# Patient Record
Sex: MALE | Race: WHITE | NOT HISPANIC OR LATINO | Employment: UNEMPLOYED | URBAN - NONMETROPOLITAN AREA
[De-identification: names, ages, dates, MRNs, and addresses within clinical notes are randomized per-mention and may not be internally consistent; named-entity substitution may affect disease eponyms.]

---

## 2024-03-26 ENCOUNTER — OFFICE VISIT (OUTPATIENT)
Dept: FAMILY MEDICINE CLINIC | Facility: CLINIC | Age: 64
End: 2024-03-26
Payer: MEDICAID

## 2024-03-26 VITALS
OXYGEN SATURATION: 94 % | SYSTOLIC BLOOD PRESSURE: 120 MMHG | HEART RATE: 71 BPM | BODY MASS INDEX: 30.23 KG/M2 | DIASTOLIC BLOOD PRESSURE: 60 MMHG | WEIGHT: 192.6 LBS | HEIGHT: 67 IN

## 2024-03-26 DIAGNOSIS — I10 HYPERTENSION, UNSPECIFIED TYPE: ICD-10-CM

## 2024-03-26 DIAGNOSIS — N40.1 BENIGN PROSTATIC HYPERPLASIA (BPH) WITH STRAINING ON URINATION: ICD-10-CM

## 2024-03-26 DIAGNOSIS — Z76.0 ENCOUNTER FOR MEDICATION REFILL: ICD-10-CM

## 2024-03-26 DIAGNOSIS — M19.019 ACROMIOCLAVICULAR JOINT ARTHRITIS, UNSPECIFIED LATERALITY: ICD-10-CM

## 2024-03-26 DIAGNOSIS — R39.16 BENIGN PROSTATIC HYPERPLASIA (BPH) WITH STRAINING ON URINATION: ICD-10-CM

## 2024-03-26 DIAGNOSIS — E11.9 TYPE 2 DIABETES MELLITUS WITHOUT COMPLICATION, WITHOUT LONG-TERM CURRENT USE OF INSULIN: Chronic | ICD-10-CM

## 2024-03-26 DIAGNOSIS — Z00.00 ANNUAL PHYSICAL EXAM: ICD-10-CM

## 2024-03-26 DIAGNOSIS — E03.9 ACQUIRED HYPOTHYROIDISM: ICD-10-CM

## 2024-03-26 DIAGNOSIS — Z00.00 ENCOUNTER FOR MEDICAL EXAMINATION TO ESTABLISH CARE: Primary | ICD-10-CM

## 2024-03-26 DIAGNOSIS — E78.5 HYPERLIPIDEMIA, UNSPECIFIED HYPERLIPIDEMIA TYPE: ICD-10-CM

## 2024-03-26 DIAGNOSIS — Z12.5 SCREENING FOR PROSTATE CANCER: ICD-10-CM

## 2024-03-26 PROBLEM — L03.115 CELLULITIS OF RIGHT LEG: Status: ACTIVE | Noted: 2023-04-19

## 2024-03-26 PROBLEM — J45.909 ASTHMA: Status: ACTIVE | Noted: 2020-12-21

## 2024-03-26 PROBLEM — R41.3 AMNESIA: Status: ACTIVE | Noted: 2021-08-18

## 2024-03-26 PROBLEM — G62.9 NEUROPATHY: Status: ACTIVE | Noted: 2020-01-24

## 2024-03-26 PROBLEM — E55.9 VITAMIN D DEFICIENCY: Status: ACTIVE | Noted: 2020-12-23

## 2024-03-26 PROBLEM — F34.1 DYSTHYMIA: Status: ACTIVE | Noted: 2022-12-02

## 2024-03-26 PROBLEM — R53.83 FATIGUE: Status: ACTIVE | Noted: 2021-08-18

## 2024-03-26 PROBLEM — C34.11 MALIGNANT NEOPLASM OF UPPER LOBE OF RIGHT LUNG: Chronic | Status: RESOLVED | Noted: 2023-02-14 | Resolved: 2024-03-26

## 2024-03-26 PROBLEM — L30.9 DERMATITIS: Status: ACTIVE | Noted: 2023-05-14

## 2024-03-26 PROBLEM — I51.89 DIASTOLIC DYSFUNCTION: Status: ACTIVE | Noted: 2023-08-14

## 2024-03-26 PROBLEM — Z72.0 TOBACCO USER: Status: ACTIVE | Noted: 2021-03-08

## 2024-03-26 PROBLEM — Z72.0 CURRENT NICOTINE USE: Status: ACTIVE | Noted: 2022-01-13

## 2024-03-26 PROBLEM — J43.1 PANLOBULAR EMPHYSEMA: Chronic | Status: ACTIVE | Noted: 2022-07-10

## 2024-03-26 PROBLEM — Z09 CHEMOTHERAPY FOLLOW-UP EXAMINATION: Status: ACTIVE | Noted: 2022-09-09

## 2024-03-26 PROBLEM — M75.42 INTERNAL IMPINGEMENT OF LEFT SHOULDER: Status: ACTIVE | Noted: 2021-05-10

## 2024-03-26 PROBLEM — C34.91 NON-SMALL CELL CANCER OF RIGHT LUNG: Chronic | Status: ACTIVE | Noted: 2022-06-20

## 2024-03-26 LAB
BILIRUB BLD-MCNC: NEGATIVE MG/DL
CLARITY, POC: CLEAR
COLOR UR: YELLOW
GLUCOSE UR STRIP-MCNC: ABNORMAL MG/DL
KETONES UR QL: NEGATIVE
LEUKOCYTE EST, POC: NEGATIVE
NITRITE UR-MCNC: NEGATIVE MG/ML
PH UR: 6 [PH] (ref 5–8)
PROT UR STRIP-MCNC: NEGATIVE MG/DL
RBC # UR STRIP: NEGATIVE /UL
SP GR UR: 1.01 (ref 1–1.03)
UROBILINOGEN UR QL: NORMAL

## 2024-03-26 RX ORDER — TAMSULOSIN HYDROCHLORIDE 0.4 MG/1
1 CAPSULE ORAL DAILY
COMMUNITY
End: 2024-03-26 | Stop reason: SDUPTHER

## 2024-03-26 RX ORDER — BUPROPION HYDROCHLORIDE 150 MG/1
150 TABLET, EXTENDED RELEASE ORAL
Qty: 90 TABLET | Refills: 1 | Status: SHIPPED | OUTPATIENT
Start: 2024-03-26 | End: 2024-09-22

## 2024-03-26 RX ORDER — TAMSULOSIN HYDROCHLORIDE 0.4 MG/1
1 CAPSULE ORAL
Qty: 90 CAPSULE | Refills: 1 | Status: SHIPPED | OUTPATIENT
Start: 2024-03-26 | End: 2024-09-22

## 2024-03-26 RX ORDER — LEVOTHYROXINE SODIUM 0.12 MG/1
125 TABLET ORAL DAILY
COMMUNITY
End: 2024-03-28 | Stop reason: SDUPTHER

## 2024-03-26 RX ORDER — BUPROPION HYDROCHLORIDE 150 MG/1
150 TABLET, EXTENDED RELEASE ORAL 2 TIMES DAILY
COMMUNITY
End: 2024-03-26 | Stop reason: SDUPTHER

## 2024-03-26 RX ORDER — CHOLECALCIFEROL (VITAMIN D3) 125 MCG
50000 TABLET ORAL WEEKLY
Qty: 12 TABLET | Refills: 1 | Status: SHIPPED | OUTPATIENT
Start: 2024-03-26 | End: 2024-04-17

## 2024-03-26 RX ORDER — DULOXETIN HYDROCHLORIDE 60 MG/1
60 CAPSULE, DELAYED RELEASE ORAL
Qty: 90 CAPSULE | Refills: 1 | Status: SHIPPED | OUTPATIENT
Start: 2024-03-26 | End: 2024-09-22

## 2024-03-26 RX ORDER — ATORVASTATIN CALCIUM 20 MG/1
20 TABLET, FILM COATED ORAL NIGHTLY
Qty: 90 TABLET | Refills: 1 | Status: SHIPPED | OUTPATIENT
Start: 2024-03-26 | End: 2024-09-22

## 2024-03-26 RX ORDER — DULAGLUTIDE 1.5 MG/.5ML
1.5 INJECTION, SOLUTION SUBCUTANEOUS WEEKLY
Qty: 2 ML | Refills: 3 | Status: SHIPPED | OUTPATIENT
Start: 2024-03-26 | End: 2024-03-28 | Stop reason: SDUPTHER

## 2024-03-26 RX ORDER — LOSARTAN POTASSIUM 25 MG/1
25 TABLET ORAL DAILY
COMMUNITY
End: 2024-03-26 | Stop reason: SDUPTHER

## 2024-03-26 RX ORDER — LOSARTAN POTASSIUM 25 MG/1
25 TABLET ORAL
Qty: 90 TABLET | Refills: 1 | Status: SHIPPED | OUTPATIENT
Start: 2024-03-26 | End: 2024-09-22

## 2024-03-26 RX ORDER — ATORVASTATIN CALCIUM 20 MG/1
20 TABLET, FILM COATED ORAL DAILY
COMMUNITY
End: 2024-03-26 | Stop reason: SDUPTHER

## 2024-03-26 RX ORDER — DULOXETIN HYDROCHLORIDE 60 MG/1
60 CAPSULE, DELAYED RELEASE ORAL DAILY
COMMUNITY
End: 2024-03-26 | Stop reason: SDUPTHER

## 2024-03-26 NOTE — PROGRESS NOTES
Chief Complaint  Establish Care (HX of lung cancer )    Subjective        Chepe Meneses presents to [facility]  C/o 1. Establish care as his PCP 2. Annual Physical 3. Fasting labs 4. Chronic illness   Diabetes  He presents for his follow-up diabetic visit. He has type 2 diabetes mellitus. No MedicAlert identification noted. His disease course has been worsening. Pertinent negatives for hypoglycemia include no headaches. Pertinent negatives for diabetes include no blurred vision and no chest pain. Symptoms are worsening. Risk factors for coronary artery disease include dyslipidemia, hypertension, obesity and post-menopausal. Current diabetic treatment includes diet and oral agent (monotherapy). He is compliant with treatment all of the time. His weight is increasing steadily. He is following a generally healthy diet. When asked about meal planning, he reported none. He has not had a previous visit with a dietitian. He participates in exercise intermittently. An ACE inhibitor/angiotensin II receptor blocker is being taken. He does not see a podiatrist. Eye exam is current.   Hypertension  This is a chronic problem. The current episode started more than 1 year ago. The problem has been stable since onset. The problem is controlled. Pertinent negatives include no blurred vision, chest pain, headaches, palpitations or shortness of breath. Agents associated with hypertension include thyroid hormones. Risk factors for coronary artery disease include dyslipidemia, diabetes mellitus, male gender and obesity. Past treatments include lifestyle changes and angiotensin blockers. Current antihypertension treatment includes lifestyle changes and angiotensin blockers. The current treatment provides moderate improvement. There are no compliance problems.  There is no history of angina, kidney disease or CAD/MI. There is no history of chronic renal disease.   Hyperlipidemia  This is a chronic problem.  "The current episode started more than 1 year ago. The problem is uncontrolled. Exacerbating diseases include obesity. He has no history of chronic renal disease. Pertinent negatives include no chest pain, focal sensory loss, leg pain or shortness of breath. Current antihyperlipidemic treatment includes statins. There are no compliance problems.  Risk factors for coronary artery disease include obesity, male sex, hypertension and diabetes mellitus.   Benign Prostatic Hypertrophy  This is a chronic problem. The current episode started more than 1 year ago. The problem has been waxing and waning since onset. Obstructive symptoms include a slower stream. Past treatments include tamsulosin. The treatment provided moderate relief.       Objective   Vital Signs:  /60   Pulse 71   Ht 170.2 cm (67\")   Wt 87.4 kg (192 lb 9.6 oz)   SpO2 94%   BMI 30.17 kg/m²   Estimated body mass index is 30.17 kg/m² as calculated from the following:    Height as of this encounter: 170.2 cm (67\").    Weight as of this encounter: 87.4 kg (192 lb 9.6 oz).            Physical Exam  Vitals and nursing note reviewed. Exam conducted with a chaperone present.   Constitutional:       General: He is awake.      Appearance: Normal appearance. He is well-developed and well-groomed. He is obese.   HENT:      Head: Normocephalic and atraumatic.      Jaw: There is normal jaw occlusion.      Right Ear: Hearing, tympanic membrane, ear canal and external ear normal.      Left Ear: Hearing, tympanic membrane, ear canal and external ear normal.      Nose: Nose normal.      Mouth/Throat:      Lips: Pink.      Mouth: Mucous membranes are moist.      Pharynx: Oropharynx is clear.   Eyes:      General: Lids are normal. Vision grossly intact. Gaze aligned appropriately.      Extraocular Movements: Extraocular movements intact.      Conjunctiva/sclera: Conjunctivae normal.      Pupils: Pupils are equal, round, and reactive to light.   Neck:      Trachea: " Trachea and phonation normal.   Cardiovascular:      Rate and Rhythm: Normal rate and regular rhythm.      Pulses: Normal pulses.      Heart sounds: Normal heart sounds.   Pulmonary:      Effort: Pulmonary effort is normal.      Breath sounds: Normal breath sounds and air entry.   Chest:      Comments: POSITIVE SURGICAL SCAR S/P LUNG CANCER SURGERY   Abdominal:      General: Abdomen is protuberant. Bowel sounds are normal.      Palpations: Abdomen is soft.   Genitourinary:     Rectum: Normal.   Musculoskeletal:         General: Normal range of motion.      Right shoulder: Normal.      Left shoulder: Normal.      Right upper arm: Normal.      Left upper arm: Normal.      Right elbow: Normal.      Left elbow: Normal.      Right forearm: Normal.      Left forearm: Normal.      Right wrist: Normal.      Left wrist: Normal.      Right hand: Normal.      Left hand: Normal.      Cervical back: Normal, full passive range of motion without pain, normal range of motion and neck supple.      Thoracic back: Normal.      Lumbar back: Normal.      Right hip: Normal.      Left hip: Normal.      Right upper leg: Normal.      Left upper leg: Normal.      Right knee: Normal.      Left knee: Normal.      Right lower leg: Normal. No edema.      Left lower leg: Normal. No edema.      Right ankle: Normal.      Right Achilles Tendon: Normal.      Left ankle: Normal.      Left Achilles Tendon: Normal.      Right foot: Normal.      Left foot: Normal.   Lymphadenopathy:      Cervical: No cervical adenopathy.   Skin:     General: Skin is warm.      Capillary Refill: Capillary refill takes less than 2 seconds.   Neurological:      General: No focal deficit present.      Mental Status: He is alert and oriented to person, place, and time.      Cranial Nerves: Cranial nerves 2-12 are intact.      Sensory: Sensation is intact.      Motor: Motor function is intact.      Coordination: Coordination is intact.      Gait: Gait is intact.      Deep  Tendon Reflexes: Reflexes are normal and symmetric.   Psychiatric:         Attention and Perception: Attention and perception normal.         Mood and Affect: Mood and affect normal.         Speech: Speech normal.         Behavior: Behavior normal. Behavior is cooperative.         Thought Content: Thought content normal.         Cognition and Memory: Cognition and memory normal.         Judgment: Judgment normal.        Result Review :  The following data was reviewed by: Gamaliel Luke MD on 03/26/2024:  Common labs          11/7/2023    11:53 11/7/2023    13:42 1/5/2024    13:49 2/27/2024    15:07 2/27/2024    16:06   Common Labs   EGFR  Am 97       97        WBC  5.82     6.17      4.84       Hemoglobin  14.2     14.5      13.0       Hematocrit  45.5     46.0      40.6       Platelets  243     212      216          Details          This result is from an external source.             Data reviewed : Radiologic studies 2/27/24 CT Chest            Assessment and Plan   Diagnoses and all orders for this visit:    1. Encounter for medical examination to establish care (Primary)    2. Annual physical exam  -     CBC & Differential; Future  -     Comprehensive Metabolic Panel; Future  -     Lipid Panel; Future  -     Hemoglobin A1c; Future  -     TSH; Future  -     Hepatitis C Antibody; Future  -     PSA Screen; Future  -     Vitamin D,25-Hydroxy; Future  -     Vitamin B12; Future  -     MicroAlbumin, Urine, Random - Urine, Clean Catch  -     POC Urinalysis Dipstick    3. Type 2 diabetes mellitus without complication, without long-term current use of insulin  -     CBC & Differential; Future  -     Comprehensive Metabolic Panel; Future  -     Lipid Panel; Future  -     Hemoglobin A1c; Future  -     TSH; Future  -     Hepatitis C Antibody; Future  -     PSA Screen; Future  -     Vitamin D,25-Hydroxy; Future  -     Vitamin B12; Future  -     MicroAlbumin, Urine, Random - Urine, Clean Catch  -     POC  Urinalysis Dipstick    4. Acquired hypothyroidism  -     CBC & Differential; Future  -     Comprehensive Metabolic Panel; Future  -     Lipid Panel; Future  -     Hemoglobin A1c; Future  -     TSH; Future  -     Hepatitis C Antibody; Future  -     PSA Screen; Future  -     Vitamin D,25-Hydroxy; Future  -     Vitamin B12; Future  -     MicroAlbumin, Urine, Random - Urine, Clean Catch  -     POC Urinalysis Dipstick    5. Hypertension, unspecified type  -     CBC & Differential; Future  -     Comprehensive Metabolic Panel; Future  -     Lipid Panel; Future  -     Hemoglobin A1c; Future  -     TSH; Future  -     Hepatitis C Antibody; Future  -     PSA Screen; Future  -     Vitamin D,25-Hydroxy; Future  -     Vitamin B12; Future  -     MicroAlbumin, Urine, Random - Urine, Clean Catch  -     POC Urinalysis Dipstick    6. Hyperlipidemia, unspecified hyperlipidemia type  -     CBC & Differential; Future  -     Comprehensive Metabolic Panel; Future  -     Lipid Panel; Future  -     Hemoglobin A1c; Future  -     TSH; Future  -     Hepatitis C Antibody; Future  -     PSA Screen; Future  -     Vitamin D,25-Hydroxy; Future  -     Vitamin B12; Future  -     MicroAlbumin, Urine, Random - Urine, Clean Catch  -     POC Urinalysis Dipstick    7. Benign prostatic hyperplasia (BPH) with straining on urination  -     PSA Screen; Future    8. Acromioclavicular joint arthritis, unspecified laterality    9. Encounter for medication refill    10. Screening for prostate cancer  -     PSA Screen; Future    Other orders  -     buPROPion SR (WELLBUTRIN SR) 150 MG 12 hr tablet; Take 1 tablet by mouth Daily With Breakfast for 180 days.  Dispense: 90 tablet; Refill: 1  -     Dulaglutide (Trulicity) 1.5 MG/0.5ML solution pen-injector; Inject 1.5 mg under the skin into the appropriate area as directed 1 (One) Time Per Week for 16 doses.  Dispense: 2 mL; Refill: 3  -     tamsulosin (FLOMAX) 0.4 MG capsule 24 hr capsule; Take 1 capsule by mouth Daily  With Dinner for 180 days.  Dispense: 90 capsule; Refill: 1  -     atorvastatin (LIPITOR) 20 MG tablet; Take 1 tablet by mouth Every Night for 180 days.  Dispense: 90 tablet; Refill: 1  -     losartan (COZAAR) 25 MG tablet; Take 1 tablet by mouth Daily With Breakfast for 180 days.  Dispense: 90 tablet; Refill: 1  -     diclofenac (VOLTAREN) 50 MG EC tablet; Take 1 tablet by mouth Daily With Lunch for 180 days.  Dispense: 90 tablet; Refill: 1  -     Ergocalciferol (Vitamin D2) 50 MCG (2000 UT) tablet; Take 50,000 Units by mouth 1 (One) Time Per Week for 4 doses.  Dispense: 12 tablet; Refill: 1  -     metFORMIN (GLUCOPHAGE) 1000 MG tablet; TAKE 1 TABS AT BREAKFAST & 1 TABS AT DINNER  Dispense: 180 tablet; Refill: 1  -     DULoxetine (CYMBALTA) 60 MG capsule; Take 1 capsule by mouth Daily With Dinner for 180 days.  Dispense: 90 capsule; Refill: 1  -     empagliflozin (Jardiance) 25 MG tablet tablet; Take 1 tablet by mouth Daily With Lunch for 180 days.  Dispense: 90 tablet; Refill: 1           I spent 30 minutes caring for Chepe on this date of service. This time includes time spent by me in the following activities:reviewing tests, obtaining and/or reviewing a separately obtained history, performing a medically appropriate examination and/or evaluation , counseling and educating the patient/family/caregiver, ordering medications, tests, or procedures, and documenting information in the medical record     The preventive exam has been reviewed in detail.  The patient has been fully counseled on preventative guidelines for vaccines, cancer screenings, and other health maintenance needs.   The patient has been counseled on guidelines for maintaining a lifestyle to promote good health and to minimize chronic diseases.  The patient has been assisted with scheduling these healthcare procedures for the coming year and given a written document of health maintenance and anticipatory guidance for age with the AVS.       Follow  Up   Return in about 1 year (around 3/27/2025) for Annual physical, RTC FASTING LABS & FSDTING LABS THIS WEEK / 1 MONTH  FF/UP .  Patient was given instructions and counseling regarding his condition or for health maintenance advice. Please see specific information pulled into the AVS if appropriate.           This document has been electronically signed by Gamaliel Luke MD  March 26, 2024 17:00 EDT

## 2024-03-28 DIAGNOSIS — E11.00 UNCONTROLLED TYPE 2 DM WITH HYPEROSMOLAR NONKETOTIC HYPERGLYCEMIA: Primary | ICD-10-CM

## 2024-03-28 DIAGNOSIS — E03.9 HYPOTHYROIDISM, UNSPECIFIED TYPE: ICD-10-CM

## 2024-03-28 LAB — MICROALBUMIN UR-MCNC: 3.2 UG/ML

## 2024-03-28 RX ORDER — LEVOTHYROXINE SODIUM 0.2 MG/1
200 TABLET ORAL
Qty: 90 TABLET | Refills: 1 | Status: SHIPPED | OUTPATIENT
Start: 2024-03-28 | End: 2024-04-01 | Stop reason: SDUPTHER

## 2024-03-28 RX ORDER — DULAGLUTIDE 4.5 MG/.5ML
4.5 INJECTION, SOLUTION SUBCUTANEOUS WEEKLY
Qty: 0.5 ML | Refills: 0 | Status: SHIPPED | OUTPATIENT
Start: 2024-03-28 | End: 2024-04-19

## 2024-03-28 NOTE — PROGRESS NOTES
Patient regarding lab results and advised concerns of high sugar and high thyroid.  I have adjusted medications for this to ailments.  And will refer to Endo.  All the other lab values were noncritical.  Patient verbalizes understanding and reassured further to please proceed to the emergency room should any symptoms occur..

## 2024-04-01 PROBLEM — L85.1 ACQUIRED PLANTAR KERATODERMA: Status: ACTIVE | Noted: 2022-10-10

## 2024-04-01 PROBLEM — I87.309 CHRONIC PERIPHERAL VENOUS HYPERTENSION: Status: ACTIVE | Noted: 2023-02-10

## 2024-04-01 PROBLEM — E11.42 DIABETIC PERIPHERAL NEUROPATHY: Status: ACTIVE | Noted: 2022-08-03

## 2024-04-01 PROBLEM — D23.70 BENIGN NEOPLASM OF SKIN OF FOOT: Status: ACTIVE | Noted: 2022-08-03

## 2024-04-01 PROBLEM — B35.1 ONYCHOMYCOSIS: Status: ACTIVE | Noted: 2022-08-03

## 2024-04-01 PROBLEM — M20.40 HAMMER TOE: Status: ACTIVE | Noted: 2023-02-10

## 2024-04-01 PROBLEM — G60.9 IDIOPATHIC PERIPHERAL NEUROPATHY: Status: ACTIVE | Noted: 2022-10-10

## 2024-04-01 RX ORDER — PANTOPRAZOLE SODIUM 40 MG/1
40 TABLET, DELAYED RELEASE ORAL DAILY
COMMUNITY

## 2024-04-01 RX ORDER — LEVOTHYROXINE SODIUM 300 UG/1
300 TABLET ORAL
Qty: 90 TABLET | Refills: 1 | Status: SHIPPED | OUTPATIENT
Start: 2024-04-01 | End: 2024-09-28

## 2024-04-22 ENCOUNTER — TELEPHONE (OUTPATIENT)
Dept: FAMILY MEDICINE CLINIC | Facility: CLINIC | Age: 64
End: 2024-04-22

## 2024-04-22 NOTE — TELEPHONE ENCOUNTER
REQUESTING CALL BACK ABOUT PT LABWORK, STATED THAT PT HAD NOT FULLY UNDERSTOOD & WANTED HER TO CALL TO ASK ABOUT IT.  CAN YOU PLEASE RETURN CALL TO PT AT EARLIEST CONVENIENCE.

## 2024-04-23 ENCOUNTER — TELEPHONE (OUTPATIENT)
Dept: FAMILY MEDICINE CLINIC | Facility: CLINIC | Age: 64
End: 2024-04-23
Payer: MEDICAID

## 2024-04-26 ENCOUNTER — OFFICE VISIT (OUTPATIENT)
Dept: FAMILY MEDICINE CLINIC | Facility: CLINIC | Age: 64
End: 2024-04-26
Payer: MEDICAID

## 2024-04-26 VITALS
TEMPERATURE: 97.5 F | HEART RATE: 67 BPM | HEIGHT: 67 IN | DIASTOLIC BLOOD PRESSURE: 78 MMHG | OXYGEN SATURATION: 98 % | SYSTOLIC BLOOD PRESSURE: 155 MMHG | RESPIRATION RATE: 18 BRPM | WEIGHT: 196.6 LBS | BODY MASS INDEX: 30.86 KG/M2

## 2024-04-26 DIAGNOSIS — E11.9 TYPE 2 DIABETES MELLITUS WITHOUT COMPLICATION, WITHOUT LONG-TERM CURRENT USE OF INSULIN: Chronic | ICD-10-CM

## 2024-04-26 DIAGNOSIS — E78.5 HYPERLIPIDEMIA, UNSPECIFIED HYPERLIPIDEMIA TYPE: ICD-10-CM

## 2024-04-26 DIAGNOSIS — I10 HYPERTENSION, UNSPECIFIED TYPE: Primary | ICD-10-CM

## 2024-04-26 DIAGNOSIS — E11.42 DIABETIC PERIPHERAL NEUROPATHY: ICD-10-CM

## 2024-04-26 RX ORDER — FLUCONAZOLE 200 MG/1
200 TABLET ORAL ONCE
COMMUNITY

## 2024-04-26 RX ORDER — ERGOCALCIFEROL 1.25 MG/1
50000 CAPSULE ORAL WEEKLY
COMMUNITY
Start: 2024-04-22

## 2024-04-26 RX ORDER — GABAPENTIN 300 MG/1
300 CAPSULE ORAL 2 TIMES DAILY
Qty: 60 CAPSULE | Refills: 0 | Status: SHIPPED | OUTPATIENT
Start: 2024-04-26 | End: 2024-05-26

## 2024-04-26 NOTE — ASSESSMENT & PLAN NOTE
Diabetes is worsening.   Medication changes per orders.  Recommended an ADA diet.  Recommended a Mediterranean style of eating  Regular aerobic exercise.  Discussed ways to avoid symptomatic hypoglycemia.  Discussed sick day management.  Discussed foot care.  Reminded to get yearly retinal exam.  Diabetes will be reassessed in 3 months      He has been unable to get trulicity increased oral meds

## 2024-04-26 NOTE — ASSESSMENT & PLAN NOTE
Diabetes is worsening   Continue current treatment regimen.  Regular aerobic exercise.  Discussed ways to avoid symptomatic hypoglycemia.  Discussed sick day management.  Discussed foot care.  Reminded to get yearly retinal exam.  Diabetes will be reassessed in 1 month

## 2024-04-26 NOTE — ASSESSMENT & PLAN NOTE
Lipid abnormalities are worsening    Plan:  Continue same medication/s without change.      Discussed medication dosage, use, side effects, and goals of treatment in detail.    Counseled patient on lifestyle modifications to help control hyperlipidemia.     Patient Treatment Goals:   LDL goal is less than 70    Followup at the next regular appointment.    Increase dose to 40 mg qd lipitor

## 2024-04-26 NOTE — ASSESSMENT & PLAN NOTE
Hypertension is uncontrolled  Medication changes per orders.  Dietary sodium restriction.  Weight loss.  Regular aerobic exercise.  Blood pressure will be reassessedat the next regular appointment.    Increase cozar to 50 mg qd

## 2024-04-26 NOTE — PROGRESS NOTES
Chief Complaint  Diabetes and Foot Pain (Has neuropathy but has not seen a foot doctor in over a year. He was wanting to know if he needs a referral to go back because it is bothering him again. )    Subjective        Chepe Meneses presents to [facility]  C/o follow up chronic illness:  Diabetes  He presents for his follow-up diabetic visit. He has type 2 diabetes mellitus. His disease course has been worsening. Pertinent negatives for hypoglycemia include no headaches. Pertinent negatives for diabetes include no chest pain. Symptoms are worsening. Risk factors for coronary artery disease include male sex, obesity, hypertension and dyslipidemia. Current diabetic treatment includes diet and oral agent (monotherapy). His weight is stable. He is following a generally healthy diet. When asked about meal planning, he reported none. He has not had a previous visit with a dietitian. He participates in exercise intermittently. An ACE inhibitor/angiotensin II receptor blocker is being taken. He does not see a podiatrist. Eye exam is current.   Foot Pain  This is a chronic problem. The current episode started more than 1 year ago. The problem occurs constantly. The problem has been gradually worsening. Pertinent negatives include no chest pain, fever, headaches or joint swelling. The symptoms are aggravated by walking. He has tried nothing for the symptoms.   Hypothyroidism  This is a chronic problem. The current episode started more than 1 year ago. The problem occurs constantly. The problem has been gradually worsening. Pertinent negatives include no chest pain, fever, headaches or joint swelling. The treatment provided mild relief.   Hypertension  This is a chronic problem. The current episode started more than 1 year ago. The problem is uncontrolled. Pertinent negatives include no chest pain, headaches, palpitations or shortness of breath. Agents associated with hypertension include thyroid  "hormones. Risk factors for coronary artery disease include obesity, male gender, dyslipidemia and diabetes mellitus. Past treatments include lifestyle changes and angiotensin blockers. Current antihypertension treatment includes lifestyle changes and angiotensin blockers. There is no history of angina, kidney disease or CAD/MI. Identifiable causes of hypertension include a thyroid problem. There is no history of chronic renal disease.   Hyperlipidemia  This is a chronic problem. The current episode started more than 1 year ago. The problem is uncontrolled. Recent lipid tests were reviewed and are high. Exacerbating diseases include hypothyroidism and obesity. He has no history of chronic renal disease. Associated symptoms include leg pain. Pertinent negatives include no chest pain, focal sensory loss or shortness of breath. Current antihyperlipidemic treatment includes statins. The current treatment provides mild improvement of lipids. There are no compliance problems.  Risk factors for coronary artery disease include obesity, male sex, hypertension and diabetes mellitus.       Objective   Vital Signs:  /78 (BP Location: Left arm, Patient Position: Sitting, Cuff Size: Adult)   Pulse 67   Temp 97.5 °F (36.4 °C) (Temporal)   Resp 18   Ht 170.2 cm (67\")   Wt 89.2 kg (196 lb 9.6 oz)   SpO2 98%   BMI 30.79 kg/m²   Estimated body mass index is 30.79 kg/m² as calculated from the following:    Height as of this encounter: 170.2 cm (67\").    Weight as of this encounter: 89.2 kg (196 lb 9.6 oz).            Physical Exam  Vitals and nursing note reviewed.   Constitutional:       Appearance: Normal appearance. He is obese.   HENT:      Head: Normocephalic.      Right Ear: Tympanic membrane normal.      Left Ear: Tympanic membrane normal.      Nose: Nose normal.      Mouth/Throat:      Mouth: Mucous membranes are moist.   Eyes:      Extraocular Movements: Extraocular movements intact.      Pupils: Pupils are equal, " round, and reactive to light.   Cardiovascular:      Rate and Rhythm: Normal rate and regular rhythm.      Pulses: Normal pulses.      Heart sounds: Normal heart sounds.   Pulmonary:      Effort: Pulmonary effort is normal.      Breath sounds: Normal breath sounds.   Abdominal:      General: Abdomen is protuberant. Bowel sounds are normal.      Palpations: Abdomen is soft.   Musculoskeletal:         General: Normal range of motion.      Cervical back: Normal range of motion and neck supple.   Skin:     General: Skin is warm.      Capillary Refill: Capillary refill takes less than 2 seconds.   Neurological:      General: No focal deficit present.      Mental Status: He is alert and oriented to person, place, and time.   Psychiatric:         Mood and Affect: Mood normal.        Result Review :  The following data was reviewed by: Gamaliel Luke MD on 04/26/2024:  Common labs          2/27/2024    15:07 2/27/2024    16:06 3/26/2024    14:50 3/27/2024    08:50   Common Labs   Glucose    309    BUN    14    Creatinine    1.10    EGFR African Am 97          Sodium    137    Potassium    4.5    Chloride    98    Calcium    9.1    Total Protein    6.2    Albumin    4.3    Total Bilirubin    0.4    Alkaline Phosphatase    61    AST (SGOT)    12    ALT (SGPT)    12    WBC  4.84      6.58    Hemoglobin  13.0      14.2    Hematocrit  40.6      43.5    Platelets  216      218    Total Cholesterol    310    Triglycerides    146    HDL Cholesterol    59    LDL Cholesterol     224    Hemoglobin A1C    11.60    Microalbumin, Urine   3.2     PSA    1.320       Details          This result is from an external source.                      Assessment and Plan   Diagnoses and all orders for this visit:    1. Hypertension, unspecified type (Primary)  Assessment & Plan:  Hypertension is uncontrolled  Medication changes per orders.  Dietary sodium restriction.  Weight loss.  Regular aerobic exercise.  Blood pressure will be  reassessedat the next regular appointment.    Increase cozar to 50 mg qd      2. Hyperlipidemia, unspecified hyperlipidemia type  Assessment & Plan:   Lipid abnormalities are worsening    Plan:  Continue same medication/s without change.      Discussed medication dosage, use, side effects, and goals of treatment in detail.    Counseled patient on lifestyle modifications to help control hyperlipidemia.     Patient Treatment Goals:   LDL goal is less than 70    Followup at the next regular appointment.    Increase dose to 40 mg qd lipitor      3. Diabetic peripheral neuropathy  Comments:  start on [place] -- it worked before  Assessment & Plan:  Diabetes is worsening   Continue current treatment regimen.  Regular aerobic exercise.  Discussed ways to avoid symptomatic hypoglycemia.  Discussed sick day management.  Discussed foot care.  Reminded to get yearly retinal exam.  Diabetes will be reassessed in 1 month    Orders:  -     gabapentin (NEURONTIN) 300 MG capsule; Take 1 capsule by mouth 2 (Two) Times a Day for 30 days.  Dispense: 60 capsule; Refill: 0    4. Type 2 diabetes mellitus without complication, without long-term current use of insulin  Assessment & Plan:  Diabetes is worsening.   Medication changes per orders.  Recommended an ADA diet.  Recommended a Mediterranean style of eating  Regular aerobic exercise.  Discussed ways to avoid symptomatic hypoglycemia.  Discussed sick day management.  Discussed foot care.  Reminded to get yearly retinal exam.  Diabetes will be reassessed in 3 months      He has been unable to get trulicity increased oral meds             I spent 30 minutes caring for Chepe on this date of service. This time includes time spent by me in the following activities:reviewing tests, obtaining and/or reviewing a separately obtained history, performing a medically appropriate examination and/or evaluation , counseling and educating the patient/family/caregiver, ordering medications, tests, or  procedures, and documenting information in the medical record     Follow Up   Return in about 3 months (around 7/26/2024) for Recheck, RTC FASTING LABS.  Patient was given instructions and counseling regarding his condition or for health maintenance advice. Please see specific information pulled into the AVS if appropriate.           This document has been electronically signed by Gamaliel Luke MD  April 26, 2024 11:54 EDT

## 2024-07-24 ENCOUNTER — OFFICE VISIT (OUTPATIENT)
Dept: ENDOCRINOLOGY | Facility: CLINIC | Age: 64
End: 2024-07-24
Payer: MEDICAID

## 2024-07-24 ENCOUNTER — SPECIALTY PHARMACY (OUTPATIENT)
Dept: ENDOCRINOLOGY | Facility: CLINIC | Age: 64
End: 2024-07-24
Payer: MEDICAID

## 2024-07-24 VITALS
OXYGEN SATURATION: 96 % | SYSTOLIC BLOOD PRESSURE: 120 MMHG | HEIGHT: 67 IN | HEART RATE: 65 BPM | DIASTOLIC BLOOD PRESSURE: 60 MMHG | WEIGHT: 195 LBS | BODY MASS INDEX: 30.61 KG/M2

## 2024-07-24 DIAGNOSIS — E78.2 MIXED HYPERLIPIDEMIA: ICD-10-CM

## 2024-07-24 DIAGNOSIS — E11.42 DIABETIC PERIPHERAL NEUROPATHY: ICD-10-CM

## 2024-07-24 DIAGNOSIS — I10 PRIMARY HYPERTENSION: ICD-10-CM

## 2024-07-24 DIAGNOSIS — E11.65 TYPE 2 DIABETES MELLITUS WITH HYPERGLYCEMIA, WITHOUT LONG-TERM CURRENT USE OF INSULIN: Primary | ICD-10-CM

## 2024-07-24 DIAGNOSIS — E03.9 ACQUIRED HYPOTHYROIDISM: ICD-10-CM

## 2024-07-24 LAB
EXPIRATION DATE: ABNORMAL
EXPIRATION DATE: ABNORMAL
GLUCOSE BLDC GLUCOMTR-MCNC: 380 MG/DL (ref 70–130)
HBA1C MFR BLD: 11.9 % (ref 4.5–5.7)
Lab: ABNORMAL
Lab: ABNORMAL
TSH SERPL DL<=0.05 MIU/L-ACNC: 5.54 UIU/ML (ref 0.27–4.2)

## 2024-07-24 PROCEDURE — 84443 ASSAY THYROID STIM HORMONE: CPT | Performed by: INTERNAL MEDICINE

## 2024-07-24 RX ORDER — DULAGLUTIDE 4.5 MG/.5ML
INJECTION, SOLUTION SUBCUTANEOUS
COMMUNITY
End: 2024-07-24

## 2024-07-24 RX ORDER — SEMAGLUTIDE 0.68 MG/ML
0.5 INJECTION, SOLUTION SUBCUTANEOUS WEEKLY
Qty: 3 ML | Refills: 0 | Status: SHIPPED | OUTPATIENT
Start: 2024-07-24 | End: 2024-07-24 | Stop reason: SDUPTHER

## 2024-07-24 RX ORDER — METOPROLOL SUCCINATE 25 MG/1
25 TABLET, EXTENDED RELEASE ORAL DAILY
COMMUNITY
Start: 2024-05-28

## 2024-07-24 RX ORDER — SEMAGLUTIDE 0.68 MG/ML
0.5 INJECTION, SOLUTION SUBCUTANEOUS WEEKLY
Qty: 3 ML | Refills: 0 | Status: SHIPPED | OUTPATIENT
Start: 2024-07-24

## 2024-07-24 RX ORDER — ASPIRIN 81 MG/1
1 TABLET ORAL DAILY
COMMUNITY
Start: 2024-05-28

## 2024-07-24 RX ORDER — GABAPENTIN 300 MG/1
300 CAPSULE ORAL 2 TIMES DAILY
COMMUNITY
Start: 2024-04-26 | End: 2024-07-26 | Stop reason: SDUPTHER

## 2024-07-24 NOTE — ASSESSMENT & PLAN NOTE
Diabetes is stable. A1c well above goal.  He hasn't been able to get trulicity due to shortage.  Try to resume GLP-1 RA if he can get it.  Will start back at lower dose and titrate up.  Continue metformin and SGLT-2 inhibitor.  Diabetes will be reassessed in 3 months,    Recent Cr and microalbumin were okay.

## 2024-07-24 NOTE — PROGRESS NOTES
Office Note      Date: 2024  Patient Name: Chepe Meneses  MRN: [id number]  : 1960    Chief Complaint   Patient presents with    Diabetes     Type II       History of Present Illness:   Chepe Meneses is a 64 y.o. male who presents for Diabetes type 2. Diagnosed in: . Treated in past with oral agents. Current treatments: metformin and jardiance. Number of insulin shots per day: none. Checks blood sugar 1 time a week. Has low blood sugar: no. Aspirin use: Yes. Statin use: Yes. ACE-I/ARB use: Yes.  Last eye exam: 2023.    Last A1c was 11.6%.  He was on trulicity but hasn't been able to get this due to shortage.  He hasn't had any formal DM education.    He has h/o hypothyroidism.  TSH was high on labs from 3/2024.  The T4 dose was increased to 300mcg qd.  He had labs done 2024 and TSH was 0.09.      Subjective      Diabetic Complications:  Eyes: No  Kidneys: No  Feet: Yes - neuropathy - on gabapentin per PCP  Heart: No    Diet and Exercise:  Meals per day: 2  Minutes of exercise per week: 0 mins.    Review of Systems:   Review of Systems   Constitutional:  Positive for activity change, appetite change, chills, diaphoresis and fatigue.   HENT:  Positive for congestion, facial swelling, rhinorrhea, tinnitus and voice change.    Eyes:  Positive for pain, discharge, redness and itching.   Respiratory:  Positive for apnea, shortness of breath and wheezing.    Cardiovascular:  Positive for leg swelling.   Gastrointestinal: Negative.    Endocrine: Positive for cold intolerance, polydipsia, polyphagia and polyuria.   Genitourinary:  Positive for urgency.   Musculoskeletal:  Positive for arthralgias, gait problem, joint swelling, myalgias and neck stiffness.   Skin:  Positive for color change.   Allergic/Immunologic: Positive for environmental allergies.   Neurological:  Positive for weakness, light-headedness, numbness and headaches.   Psychiatric/Behavioral:  Positive for agitation and sleep  "disturbance.        The following portions of the patient's history were reviewed and updated as appropriate: allergies, current medications, past family history, past medical history, past social history, past surgical history, and problem list.    Objective     Visit Vitals  /60 (BP Location: Left arm, Patient Position: Sitting, Cuff Size: Adult)   Pulse 65   Ht 170.2 cm (67\")   Wt 88.5 kg (195 lb)   SpO2 96%   BMI 30.54 kg/m²       Physical Exam:  Physical Exam  Constitutional:       Appearance: Normal appearance.   HENT:      Head: Normocephalic and atraumatic.   Eyes:      Extraocular Movements: Extraocular movements intact.      Conjunctiva/sclera: Conjunctivae normal.   Neck:      Thyroid: No thyroid mass, thyromegaly or thyroid tenderness.   Cardiovascular:      Rate and Rhythm: Normal rate and regular rhythm.      Pulses: Normal pulses.           Dorsalis pedis pulses are 2+ on the right side and 2+ on the left side.        Posterior tibial pulses are 2+ on the right side and 2+ on the left side.      Heart sounds: Normal heart sounds.   Pulmonary:      Effort: Pulmonary effort is normal.      Breath sounds: Normal breath sounds.   Abdominal:      General: Bowel sounds are normal.      Palpations: Abdomen is soft.   Musculoskeletal:         General: Normal range of motion.      Cervical back: Normal range of motion and neck supple.   Feet:      Right foot:      Protective Sensation: 5 sites tested.  2 sites sensed.      Skin integrity: Skin integrity normal.      Toenail Condition: Right toenails are abnormally thick. Fungal disease present.     Left foot:      Protective Sensation: 5 sites tested.  2 sites sensed.      Skin integrity: Skin integrity normal.      Toenail Condition: Left toenails are abnormally thick. Fungal disease present.     Comments: Decreased sensation to midfoot bilaterally  Lymphadenopathy:      Cervical: No cervical adenopathy.   Skin:     General: Skin is warm and dry. " "  Neurological:      General: No focal deficit present.      Mental Status: He is alert.   Psychiatric:         Mood and Affect: Mood normal.         Behavior: Behavior normal.         Thought Content: Thought content normal.         Judgment: Judgment normal.         Labs:    HbA1c  Hemoglobin A1C   Date Value Ref Range Status   07/24/2024 11.9 (A) 4.5 - 5.7 % Final   07/07/2023 9.7 (H) <5.7 % Final   .    CMP  Lab Results   Component Value Date    GLUCOSE 309 (H) 03/27/2024    BUN 14 03/27/2024    CREATININE 1.10 03/27/2024    EGFRIFNONA >60 05/30/2022    EGFRIFAFRI 99 06/11/2024    BCR 12.7 03/27/2024    K 4.5 03/27/2024    CO2 29.3 (H) 03/27/2024    CALCIUM 9.1 03/27/2024    PROTENTOTREF 6.2 03/27/2024    LABIL2 2.3 03/27/2024    AST 12 03/27/2024    ALT 12 03/27/2024        Lipid Panel  Lab Results   Component Value Date    CHLPL 310 (H) 03/27/2024    HDL 59 03/27/2024     (H) 03/27/2024    TRIG 146 03/27/2024        TSH  Lab Results   Component Value Date    TSH 54.900 (H) 03/27/2024    FREET4 2.4 (H) 06/11/2024        Hemoglobin A1C  Lab Results   Component Value Date    HGBA1C 11.9 (A) 07/24/2024        Microalbumin/Creatinine  No results found for: \"MALBCRERATI\"        Assessment / Plan      Assessment & Plan:  Diagnoses and all orders for this visit:    1. Type 2 diabetes mellitus with hyperglycemia, without long-term current use of insulin (Primary)  Assessment & Plan:  Diabetes is stable. A1c well above goal.  He hasn't been able to get trulicity due to shortage.  Try to resume GLP-1 RA if he can get it.  Will start back at lower dose and titrate up.  Continue metformin and SGLT-2 inhibitor.  Diabetes will be reassessed in 3 months,    Recent Cr and microalbumin were okay.    Orders:  -     POC Glucose, Blood  -     POC Glycosylated Hemoglobin (Hb A1C)  -     Ambulatory Referral to Diabetic Education    2. Primary hypertension  Assessment & Plan:  Hypertension is stable and controlled  Continue " current treatment regimen.  Blood pressure will be reassessed in 3 months.      3. Mixed hyperlipidemia  Assessment & Plan:  Continue statin.  Last lipids not at goal.  This should improve with better DM control.      4. Acquired hypothyroidism  Assessment & Plan:  Continue T4 tx.  He is on high dose currently.  Will send note about results.    Orders:  -     TSH; Future    5. Diabetic peripheral neuropathy  Assessment & Plan:  Continue gabapentin.  Work on better DM control.  Encouraged podiatry follow up.      Other orders  -     Semaglutide,0.25 or 0.5MG/DOS, (Ozempic, 0.25 or 0.5 MG/DOSE,) 2 MG/3ML solution pen-injector; Inject 0.5 mg under the skin into the appropriate area as directed 1 (One) Time Per Week.  Dispense: 3 mL; Refill: 0       Current Outpatient Medications   Medication Instructions    aspirin 81 MG EC tablet 1 tablet, Oral, Daily    atorvastatin (LIPITOR) 20 mg, Oral, Nightly    buPROPion SR (WELLBUTRIN SR) 150 mg, Oral, Daily With Breakfast    diclofenac (VOLTAREN) 50 mg, Oral, Daily With Lunch    DULoxetine (CYMBALTA) 60 mg, Oral, Daily With Dinner    empagliflozin (JARDIANCE) 25 mg, Oral, Daily With Lunch    fluconazole (DIFLUCAN) 200 mg, Oral, Once    gabapentin (NEURONTIN) 300 mg, Oral, 2 Times Daily    gabapentin (NEURONTIN) 300 mg, Oral, 2 Times Daily    levothyroxine (SYNTHROID) 300 mcg, Oral, Every Early Morning    losartan (COZAAR) 25 mg, Oral, Daily With Breakfast    metFORMIN (GLUCOPHAGE) 1000 MG tablet TAKE 1 TABS AT BREAKFAST & 1 TABS AT DINNER    metoprolol succinate XL (TOPROL-XL) 25 mg, Oral, Daily, Patient takes 1/2 tab    Ozempic (0.25 or 0.5 MG/DOSE) 0.5 mg, Subcutaneous, Weekly    pantoprazole (PROTONIX) 40 mg, Oral, Daily    tamsulosin (FLOMAX) 0.4 mg, Oral, Daily With Dinner    vitamin D (ERGOCALCIFEROL) 50,000 Units, Oral, Weekly      Return in about 3 months (around 10/24/2024) for Recheck with A1c, CMP, TSH - okay to schedule with PA.    Electronically signed by: Layo  Jack Boateng MD  07/24/2024

## 2024-07-24 NOTE — PROGRESS NOTES
Specialty Pharmacy Patient Management Program  Endocrinology Initial Fill Outreach      Chepe is a 64 y.o. male contacted today regarding initial fill of his medication(s).    Specialty medication(s) and dose(s) confirmed: Ozempic, new start.     Delivery Questions      Flowsheet Row Most Recent Value   Delivery method [employer]   Delivery address verified with patient/caregiver? Yes   Delivery address Home   Number of medications in delivery 1   Medication(s) being filled and delivered Semaglutide   Doses left of specialty medications New Start   Copay verified? Yes   Copay amount 0.00   Copay form of payment No copayment ($0)   Ship Date 7/24/24   Delivery Date 7/25/24   Signature Required Yes            Follow-Up: 28d    Chelsey Lr, Pharm.D. BCPS, BCCCP  Clinical Specialty Pharmacist, Endocrinology   09:25 EDT  07/24/24

## 2024-07-24 NOTE — PROGRESS NOTES
Specialty Pharmacy Patient Management Program  Endocrinology Initial Assessment     Chepe Meneses was referred by an Endocrinology provider to the Endocrinology Patient Management program offered by [facility] for Type 2 Diabetes on 07/24/24.  An initial outreach was conducted, including assessment of therapy appropriateness and specialty medication education for Ozempic. The patient was introduced to services offered by [facility], including: regular assessments, refill coordination, curbside pick-up or mail order delivery options, prior authorization maintenance, and financial assistance programs as applicable. The patient was also provided with contact information for the pharmacy team.     Insurance Coverage & Financial Support  Medicaid     Relevant Past Medical History and Comorbidities  Relevant medical history and concomitant health conditions were discussed with the patient. The patient's chart has been reviewed for relevant past medical history and comorbid conditions and updated as necessary.  Past Medical History:   Diagnosis Date    Allergic     Depression     Diabetes mellitus     Hyperlipidemia     Hypertension     Lung cancer     Malignant neoplasm of upper lobe of right lung 02/14/2023    Thyroid disorder     Type 2 diabetes mellitus      Social History     Socioeconomic History    Marital status:    Tobacco Use    Smoking status: Former     Current packs/day: 2.00     Average packs/day: 2.0 packs/day for 2.6 years (5.1 ttl pk-yrs)     Types: Cigarettes     Start date: 01/2022     Quit date: 01/1978    Smokeless tobacco: Never   Vaping Use    Vaping status: Never Used   Substance and Sexual Activity    Alcohol use: Never    Drug use: Never    Sexual activity: Defer       Problem list reviewed by Chelsey Lr, PharmD on 7/24/2024 at  9:23 AM    Allergies  Known allergies and reactions were discussed with the patient. The patient's chart has been  reviewed for  allergy information and updated as necessary.   No Known Allergies    Allergies reviewed by Chelsey Lr, Kristina on 7/24/2024 at  9:23 AM    Relevant Laboratory Values  Relevant laboratory values were discussed with the patient. The following specialty medication dose adjustment(s) are recommended:  None  A1C Last 3 Results          3/27/2024    08:50 7/24/2024    08:21   HGBA1C Last 3 Results   Hemoglobin A1C 11.60  11.9      Lab Results   Component Value Date    HGBA1C 11.9 (A) 07/24/2024     Lab Results   Component Value Date    GLUCOSE 309 (H) 03/27/2024    CALCIUM 9.1 03/27/2024     03/27/2024    K 4.5 03/27/2024    CO2 29.3 (H) 03/27/2024    CL 98 03/27/2024    BUN 14 03/27/2024    CREATININE 1.10 03/27/2024    EGFRIFAFRI 99 06/11/2024    EGFRIFNONA >60 05/30/2022    BCR 12.7 03/27/2024    ANIONGAP 10 05/30/2022     Lab Results   Component Value Date    CHLPL 310 (H) 03/27/2024    TRIG 146 03/27/2024    HDL 59 03/27/2024     (H) 03/27/2024     Microalbumin          3/26/2024    14:50   Microalbumin   Microalbumin, Urine 3.2        Current Medication List  This medication list has been reviewed with the patient and evaluated for any interactions or necessary modifications/recommendations, and updated to include all prescription medications, OTC medications, and supplements the patient is currently taking.  This list reflects what is contained in the patient's profile, which has also been marked as reviewed to communicate to other providers it is the most up to date version of the patient's current medication therapy.     Current Outpatient Medications:     aspirin 81 MG EC tablet, Take 1 tablet by mouth Daily., Disp: , Rfl:     atorvastatin (LIPITOR) 20 MG tablet, Take 1 tablet by mouth Every Night for 180 days., Disp: 90 tablet, Rfl: 1    buPROPion SR (WELLBUTRIN SR) 150 MG 12 hr tablet, Take 1 tablet by mouth Daily With Breakfast for 180 days., Disp: 90 tablet, Rfl: 1     diclofenac (VOLTAREN) 50 MG EC tablet, Take 1 tablet by mouth Daily With Lunch for 180 days., Disp: 90 tablet, Rfl: 1    DULoxetine (CYMBALTA) 60 MG capsule, Take 1 capsule by mouth Daily With Dinner for 180 days., Disp: 90 capsule, Rfl: 1    empagliflozin (Jardiance) 25 MG tablet tablet, Take 1 tablet by mouth Daily With Lunch for 180 days., Disp: 90 tablet, Rfl: 1    fluconazole (DIFLUCAN) 200 MG tablet, Take 1 tablet by mouth 1 (One) Time., Disp: , Rfl:     gabapentin (NEURONTIN) 300 MG capsule, Take 1 capsule by mouth 2 (Two) Times a Day for 30 days., Disp: 60 capsule, Rfl: 0    gabapentin (NEURONTIN) 300 MG capsule, Take 1 capsule by mouth 2 (Two) Times a Day., Disp: , Rfl:     levothyroxine (Synthroid) 300 MCG tablet, Take 1 tablet by mouth Every Morning for 180 days. (Patient taking differently: Take 0.5 tablets by mouth Every Morning.), Disp: 90 tablet, Rfl: 1    losartan (COZAAR) 25 MG tablet, Take 1 tablet by mouth Daily With Breakfast for 180 days., Disp: 90 tablet, Rfl: 1    metFORMIN (GLUCOPHAGE) 1000 MG tablet, TAKE 1 TABS AT BREAKFAST & 1 TABS AT DINNER, Disp: 180 tablet, Rfl: 1    metoprolol succinate XL (TOPROL-XL) 25 MG 24 hr tablet, Take 1 tablet by mouth Daily. Patient takes 1/2 tab, Disp: , Rfl:     pantoprazole (PROTONIX) 40 MG EC tablet, Take 1 tablet by mouth Daily., Disp: , Rfl:     Semaglutide,0.25 or 0.5MG/DOS, (Ozempic, 0.25 or 0.5 MG/DOSE,) 2 MG/3ML solution pen-injector, Inject 0.5 mg under the skin into the appropriate area as directed 1 (One) Time Per Week., Disp: 3 mL, Rfl: 0    tamsulosin (FLOMAX) 0.4 MG capsule 24 hr capsule, Take 1 capsule by mouth Daily With Dinner for 180 days., Disp: 90 capsule, Rfl: 1    vitamin D (ERGOCALCIFEROL) 1.25 MG (46938 UT) capsule capsule, Take 1 capsule by mouth 1 (One) Time Per Week., Disp: , Rfl:     Medicines reviewed by Chelsey Lr, Kristina on 7/24/2024 at  9:23 AM    Drug Interactions  No Clinically Significant DDIs Were Identified at Present  Time Upon Marking Medications Reviewed      Recommended Medications Assessment  Aspirin: Currently Taking   Statin: Currently Taking   ACEi/ARB: Currently Taking     Initial Education Provided for Specialty Medication  The patient has been provided with the following education and any applicable administration techniques (i.e. self-injection) have been demonstrated for the therapies indicated. All questions and concerns have been addressed prior to the patient receiving the medication, and the patient has verbalized comprehension of the education and any materials provided. Additional patient education shall be provided and documented upon request by the patient, provider, or payer.    Ozempic® (semaglutide)  Medication Expectations   Why am I taking this medication? You are taking Ozempic, along with diet and exercise, to lower blood sugar because you have type 2 diabetes. You may also be taking Ozempic if you have diabetes and cardiovascular disease to reduce your risk of heart attack or stroke.    What should I expect while on this medication? You should expect to see your blood sugar, A1c and possibly weight decrease over time.   How does the medication work? Ozempic is a non-insulin injection that works with your body to release insulin in response to your blood sugar rising.  This medication also slows down food from leaving your stomach, making you feel [place] for longer.   How long will I be on this medication for? The amount of time you will be on this medication will be determined by your doctor based on blood sugar and A1c control. You will most likely be on this medication or another diabetes medication throughout your lifetime. Do not abruptly stop this medication without talking to your doctor first.    How do I take this medication? Take as directed on your prescription label. Ozempic is injected under the skin (subcutaneously) of your stomach, thigh or upper arm.  Use this medication once weekly, on  the same day each week, and it can be given with or without food.    What are some possible side effects? You may notice you don't feel as hungry, especially when you first start using Ozempic.  The most common side effects are nausea, stomach cramping, and constipation. Stop using Ozempic and call your doctor immediately if you have severe pain in your stomach area that will not go away.    What happens if I miss a dose? If you miss a dose, take it as soon as you remember as long as it is within 5 days after your missed dose.  If more than 5 days have passed, skip the missed dose and resume Ozempic on the regularly scheduled day.     Medication Safety   What are things I should warn my doctor immediately about? Do not use Ozempic if you or a family member have ever had medullary thyroid cancer (MTC) or Multiple Endocrine Neoplasia syndrome type 2 (MEN 2).  Tell your doctor if you have or have had problems with your kidneys or pancreas, or if you are pregnant, planning to become pregnant. Stop using Ozempic and get medical help right away if you have severe pain that will not go away, or if you notice any signs/symptoms of an allergic reaction (rash, hives, difficulty breathing, etc.).    What are things that I should be cautious of? Be cautious of any side effects from this medication. Talk to your doctor if any new ones develop or aren't getting better.    What are some medications that can interact with this one? Taking Ozempic with other medications that also lower your blood sugar such as insulin and glipizide/glimepiride/glyburide may increase the risk of low blood sugar. Your doctor may reduce the dose of these medications when you start Ozempic.  Always tell your doctor or pharmacist immediately if you start taking any new medications, including over-the-counter medications, vitamins, and herbal supplements.       Medication Storage/Handling   How should I handle this medication? Keep this medication out of  reach of pets/children and keep the pen capped    How does this medication need to be stored? Store in the refrigerator prior to first use, but do not freeze.  After first use, you may continue to store in the refrigerator or at room temperature for 56 days.  Protect from excessive heat and sunlight.   How should I dispose of this medication? Used Ozempic pens should be thrown away after 56 days, even if medication remains. If your doctor decides to stop this medication, take to your local police station for proper disposal. Some pharmacies also have take-back bins for medication drop-off.      Resources/Support   How can I remind myself to take this medication? You can download reminder apps to help you manage your refills. You may also set an alarm on your phone to remind you.    Is financial support available?  [employer] can provide co-pay cards if you have commercial insurance or patient assistance if you have Medicare or no insurance.    Which vaccines are recommended for me? Talk to your doctor about these vaccines: Flu, Coronavirus (COVID-19), Pneumococcal (pneumonia), Tdap, Hepatitis B, Zoster (shingles)          Adherence and Self-Administration  Adherence related to the patient's specialty therapy was discussed with the patient. The Adherence segment of this outreach has been reviewed and updated.     Is there a concern with patient's ability to self administer the medication correctly and without issue?: No  Were any potential barriers to adherence identified during the initial assessment or patient education?: No  Are there any concerns regarding the patient's understanding of the importance of medication adherence?: No  Methods for Supporting Patient Adherence and/or Self-Administration:  No issues.     Open Medication Therapy Problems  No medication therapy recommendations to display    Goals of Therapy  Goals related to the patient's specialty therapy were discussed with the patient. The Patient  Goals segment of this outreach has been reviewed and updated.   Goals Addressed Today        Specialty Pharmacy General Goal      A1C < 7 %     Lab Results    Component Value Date     HGBA1C 11.9 (A) 07/24/2024 New enrollment. Starting Ozempic.                  Reassessment Plan & Follow-Up  1. Medication Therapy Changes:  New start Ozempic. Plan for a quick titration schedule with Dr. Boateng.   2. Related Plans, Therapy Recommendations, or Therapy Problems to Be Addressed:  None.   3. Pharmacist to perform regular assessments no more than (6) months from the previous assessment.  4. Care Coordinator to set up future refill outreaches, coordinate prescription delivery, and escalate clinical questions to pharmacist.  5. Welcome information and patient satisfaction survey to be sent by specialty pharmacy team with patient's initial fill.    Attestation  Therapeutic appropriateness: Appropriate   I attest the patient was actively involved in and has agreed to the above plan of care. If the prescribed therapy is at any point deemed not appropriate based on the current or future assessments, a consultation will be initiated with the patient's specialty care provider to determine the best course of action. The revised plan of therapy will be documented along with any required assessments and/or additional patient education provided.     Chelsey Lr, PharmD, BCCCP, BCPS  Clinical Specialty Pharmacist, Endocrinology  7/24/2024  09:25 EDT

## 2024-07-26 ENCOUNTER — OFFICE VISIT (OUTPATIENT)
Dept: FAMILY MEDICINE CLINIC | Facility: CLINIC | Age: 64
End: 2024-07-26
Payer: MEDICAID

## 2024-07-26 VITALS
DIASTOLIC BLOOD PRESSURE: 64 MMHG | BODY MASS INDEX: 30.95 KG/M2 | HEART RATE: 67 BPM | OXYGEN SATURATION: 95 % | RESPIRATION RATE: 20 BRPM | SYSTOLIC BLOOD PRESSURE: 126 MMHG | TEMPERATURE: 96.8 F | WEIGHT: 197.2 LBS | HEIGHT: 67 IN

## 2024-07-26 DIAGNOSIS — Z76.0 ENCOUNTER FOR MEDICATION REFILL: ICD-10-CM

## 2024-07-26 DIAGNOSIS — E78.5 HYPERLIPIDEMIA, UNSPECIFIED HYPERLIPIDEMIA TYPE: ICD-10-CM

## 2024-07-26 DIAGNOSIS — E11.65 TYPE 2 DIABETES MELLITUS WITH HYPERGLYCEMIA, WITHOUT LONG-TERM CURRENT USE OF INSULIN: ICD-10-CM

## 2024-07-26 DIAGNOSIS — E03.9 ACQUIRED HYPOTHYROIDISM: ICD-10-CM

## 2024-07-26 DIAGNOSIS — I10 HYPERTENSION, UNSPECIFIED TYPE: Primary | ICD-10-CM

## 2024-07-26 PROBLEM — Z72.0 CURRENT NICOTINE USE: Status: RESOLVED | Noted: 2022-01-13 | Resolved: 2024-07-26

## 2024-07-26 PROBLEM — I25.10 ATHEROSCLEROSIS OF CORONARY ARTERY WITHOUT ANGINA PECTORIS: Status: ACTIVE | Noted: 2024-07-09

## 2024-07-26 PROBLEM — Z87.891 DISCONTINUED SMOKING: Status: ACTIVE | Noted: 2024-07-26

## 2024-07-26 PROCEDURE — 3074F SYST BP LT 130 MM HG: CPT | Performed by: PSYCHOLOGIST

## 2024-07-26 PROCEDURE — 1160F RVW MEDS BY RX/DR IN RCRD: CPT | Performed by: PSYCHOLOGIST

## 2024-07-26 PROCEDURE — 1125F AMNT PAIN NOTED PAIN PRSNT: CPT | Performed by: PSYCHOLOGIST

## 2024-07-26 PROCEDURE — 1159F MED LIST DOCD IN RCRD: CPT | Performed by: PSYCHOLOGIST

## 2024-07-26 PROCEDURE — 3046F HEMOGLOBIN A1C LEVEL >9.0%: CPT | Performed by: PSYCHOLOGIST

## 2024-07-26 PROCEDURE — 3078F DIAST BP <80 MM HG: CPT | Performed by: PSYCHOLOGIST

## 2024-07-26 PROCEDURE — 99214 OFFICE O/P EST MOD 30 MIN: CPT | Performed by: PSYCHOLOGIST

## 2024-07-26 RX ORDER — LEVOTHYROXINE SODIUM 0.12 MG/1
125 TABLET ORAL
Qty: 90 TABLET | Refills: 0 | Status: SHIPPED | OUTPATIENT
Start: 2024-07-26 | End: 2024-10-24

## 2024-07-26 RX ORDER — NYSTATIN 100000 U/G
1 CREAM TOPICAL 2 TIMES DAILY
Qty: 60 G | Refills: 0 | Status: SHIPPED | OUTPATIENT
Start: 2024-07-26 | End: 2024-08-25

## 2024-07-26 NOTE — ASSESSMENT & PLAN NOTE
Diabetes is worsening.   Medication changes per orders.  Recommended an ADA diet.  Regular aerobic exercise.  Discussed ways to avoid symptomatic hypoglycemia.  Discussed sick day management.  Discussed foot care.  Reminded to get yearly retinal exam.  Diabetes will be reassessed in 3 months    Denies PMH  pancreatitis and FH of medullary thyroid cancer

## 2024-07-26 NOTE — PROGRESS NOTES
"Chief Complaint  Follow-up (Hypothyroid, HTN, Type II DM, Vitamin D def. (Saw Dr Mccracken in [place] Wednesday 7/24/2024 and had labs drawn)/Slight cough since yesterday from nasal drainage (allergies). No fever/Requests re-order of Nystatin Cream (100,000 USP units)/)    Subjective        Chepe Meneses presents to [facility]  C/o   Follow-up  Conditions present:  Diabetes, Hypertension and Hyperlipidemia  Current symptoms include no chest pain, no palpitations, no fatigue, no blurred vision, no foot ulcerations, no polyuria and no hypertension associated anxiety. Medication compliance: good. Treatment compliance: all of the time. Treatment barriers: no compliance problems. Exercises intermittently.   Diabetes: He has type 2 diabetes mellitus. Current diabetic treatment: diet and oral medications. He monitors blood glucose at home not monitored. Meal planning: no current meal planning. Eye exam is not current per patient.  He doesn't see a podiatrist.   Additional diabetes comments: Will schedule for yearly eye exam   Hypertension: The problem is controlled. Current antihypertension treatment: beta blockers, angiotensin blockers and lifestyle changes. Past treatments: angiotensin blockers, lifestyle changes and beta blockers.   Hyperlipidemia: Recent lipid tests were reviewed and are high. Exacerbating diseases: diabetes and hypothyroidism. Current antihyperlipidemic treatment: statins and exercise.       Objective   Vital Signs:  /64 (BP Location: Right arm, Patient Position: Sitting, Cuff Size: Adult)   Pulse 67   Temp 96.8 °F (36 °C) (Temporal)   Resp 20   Ht 170.2 cm (67\")   Wt 89.4 kg (197 lb 3.2 oz)   SpO2 95%   BMI 30.89 kg/m²   Estimated body mass index is 30.89 kg/m² as calculated from the following:    Height as of this encounter: 170.2 cm (67\").    Weight as of this encounter: 89.4 kg (197 lb 3.2 oz).         Physical Exam  Vitals and nursing note reviewed. "   Constitutional:       Appearance: Normal appearance. He is obese.   HENT:      Head: Normocephalic.      Right Ear: Tympanic membrane normal.      Left Ear: Tympanic membrane normal.      Nose: Nose normal.      Mouth/Throat:      Mouth: Mucous membranes are moist.   Eyes:      Extraocular Movements: Extraocular movements intact.      Pupils: Pupils are equal, round, and reactive to light.   Cardiovascular:      Rate and Rhythm: Normal rate and regular rhythm.      Pulses: Normal pulses.      Heart sounds: Normal heart sounds.   Pulmonary:      Effort: Pulmonary effort is normal.      Breath sounds: Normal breath sounds.   Abdominal:      General: Abdomen is protuberant. Bowel sounds are normal.      Palpations: Abdomen is soft.   Musculoskeletal:         General: Normal range of motion.      Cervical back: Normal range of motion and neck supple.   Skin:     General: Skin is warm.      Capillary Refill: Capillary refill takes less than 2 seconds.   Neurological:      General: No focal deficit present.      Mental Status: He is alert and oriented to person, place, and time.   Psychiatric:         Mood and Affect: Mood normal.          Diabetic foot exam:   Left: Filament test present   Pulses Dorsalis Pedis:  diminished  Posterior Tibial:  present   Reflexes 4+    Vibratory sensation normal   Proprioception normal   Sharp/dull discrimination diminished       Right: Filament test present   Pulses Dorsalis Pedis:  present  Posterior Tibial:  present   Reflexes 4+    Vibratory sensation normal   Proprioception normal   Sharp/dull discrimination absent        Result Review :  The following data was reviewed by: Gamaliel Luke MD on 07/26/2024:  Common labs          3/27/2024    08:50 6/11/2024    07:08 6/11/2024    09:17 7/24/2024    08:21   Common Labs   Glucose 309       BUN 14       Creatinine 1.10       EGFR  Am  99         Sodium 137       Potassium 4.5       Chloride 98       Calcium 9.1       Total  Protein 6.2       Albumin 4.3       Total Bilirubin 0.4       Alkaline Phosphatase 61       AST (SGOT) 12       ALT (SGPT) 12       WBC 6.58   6.01        Hemoglobin 14.2   13.6        Hematocrit 43.5   42.1        Platelets 218   234        Total Cholesterol 310       Triglycerides 146       HDL Cholesterol 59       LDL Cholesterol  224       Hemoglobin A1C 11.60    11.9    PSA 1.320          Details          This result is from an external source.             Data reviewed : Radiologic studies 6/11/24  Stress Test            Assessment and Plan   Diagnoses and all orders for this visit:    1. Hypertension, unspecified type (Primary)  Assessment & Plan:  Hypertension is stable and controlled  Continue current treatment regimen.  Dietary sodium restriction.  Weight loss.  Regular aerobic exercise.  Blood pressure will be reassessed in 3 months.    Orders:  -     Lipid Panel; Future  -     Amylase; Future  -     Lipase; Future    2. Hyperlipidemia, unspecified hyperlipidemia type  Assessment & Plan:   Lipid abnormalities are improving with treatment    Plan:  Continue same medication/s without change.      Discussed medication dosage, use, side effects, and goals of treatment in detail.    Counseled patient on lifestyle modifications to help control hyperlipidemia.     Patient Treatment Goals:   LDL goal is less than 70    Followup at the next regular appointment.    Orders:  -     Lipid Panel; Future  -     Amylase; Future  -     Lipase; Future    3. Type 2 diabetes mellitus with hyperglycemia, without long-term current use of insulin  Assessment & Plan:  Diabetes is worsening.   Medication changes per orders.  Recommended an ADA diet.  Regular aerobic exercise.  Discussed ways to avoid symptomatic hypoglycemia.  Discussed sick day management.  Discussed foot care.  Reminded to get yearly retinal exam.  Diabetes will be reassessed in 3 months    Denies PMH  pancreatitis and FH of medullary thyroid  cancer    Orders:  -     Lipid Panel; Future  -     Amylase; Future  -     Lipase; Future    4. Acquired hypothyroidism  Assessment & Plan:  Will adjust dose of medication today      5. Encounter for medication refill    Other orders  -     levothyroxine (Synthroid) 125 MCG tablet; Take 1 tablet by mouth Every Morning for 90 days.  Dispense: 90 tablet; Refill: 0  -     nystatin (MYCOSTATIN) 768474 UNIT/GM cream; Apply 1 Application topically to the appropriate area as directed 2 (Two) Times a Day for 30 days.  Dispense: 60 g; Refill: 0           I spent 30 minutes caring for Chepe on this date of service. This time includes time spent by me in the following activities:reviewing tests, obtaining and/or reviewing a separately obtained history, performing a medically appropriate examination and/or evaluation , counseling and educating the patient/family/caregiver, ordering medications, tests, or procedures, and documenting information in the medical record       Follow Up   Return in about 3 months (around 10/26/2024) for Recheck, RTC FASTING LABS-- chrinic illness/ adjusted dose of medicines.  Patient was given instructions and counseling regarding his condition or for health maintenance advice. Please see specific information pulled into the AVS if appropriate.           This document has been electronically signed by Gamaliel Luke MD  July 26, 2024 10:51 EDT

## 2024-08-21 RX ORDER — DULOXETIN HYDROCHLORIDE 60 MG/1
CAPSULE, DELAYED RELEASE ORAL
Qty: 30 CAPSULE | Refills: 1 | Status: SHIPPED | OUTPATIENT
Start: 2024-08-21

## 2024-08-21 RX ORDER — LOSARTAN POTASSIUM 25 MG/1
TABLET ORAL
Qty: 30 TABLET | Refills: 1 | Status: SHIPPED | OUTPATIENT
Start: 2024-08-21

## 2024-08-21 RX ORDER — BUPROPION HYDROCHLORIDE 150 MG/1
TABLET, EXTENDED RELEASE ORAL
Qty: 30 TABLET | Refills: 1 | Status: SHIPPED | OUTPATIENT
Start: 2024-08-21

## 2024-08-21 RX ORDER — EMPAGLIFLOZIN 25 MG/1
TABLET, FILM COATED ORAL
Qty: 30 TABLET | Refills: 1 | Status: SHIPPED | OUTPATIENT
Start: 2024-08-21

## 2024-08-21 RX ORDER — TAMSULOSIN HYDROCHLORIDE 0.4 MG/1
CAPSULE ORAL
Qty: 30 CAPSULE | Refills: 1 | Status: SHIPPED | OUTPATIENT
Start: 2024-08-21

## 2024-08-21 RX ORDER — ATORVASTATIN CALCIUM 20 MG/1
20 TABLET, FILM COATED ORAL NIGHTLY
Qty: 30 TABLET | Refills: 1 | Status: SHIPPED | OUTPATIENT
Start: 2024-08-21

## 2024-08-21 RX ORDER — ERGOCALCIFEROL 1.25 MG/1
CAPSULE ORAL
Qty: 4 CAPSULE | Refills: 1 | Status: SHIPPED | OUTPATIENT
Start: 2024-08-21

## 2024-10-18 RX ORDER — BUPROPION HYDROCHLORIDE 150 MG/1
150 TABLET, EXTENDED RELEASE ORAL
Qty: 30 TABLET | Refills: 1 | Status: SHIPPED | OUTPATIENT
Start: 2024-10-18

## 2024-10-18 RX ORDER — DULOXETIN HYDROCHLORIDE 60 MG/1
CAPSULE, DELAYED RELEASE ORAL
Qty: 30 CAPSULE | Refills: 1 | Status: SHIPPED | OUTPATIENT
Start: 2024-10-18

## 2024-10-18 RX ORDER — ATORVASTATIN CALCIUM 20 MG/1
20 TABLET, FILM COATED ORAL
Qty: 30 TABLET | Refills: 1 | Status: SHIPPED | OUTPATIENT
Start: 2024-10-18

## 2024-10-18 RX ORDER — EMPAGLIFLOZIN 25 MG/1
TABLET, FILM COATED ORAL
Qty: 30 TABLET | Refills: 1 | Status: SHIPPED | OUTPATIENT
Start: 2024-10-18

## 2024-10-18 RX ORDER — TAMSULOSIN HYDROCHLORIDE 0.4 MG/1
1 CAPSULE ORAL DAILY
Qty: 30 CAPSULE | Refills: 1 | Status: SHIPPED | OUTPATIENT
Start: 2024-10-18 | End: 2024-10-21

## 2024-10-18 RX ORDER — LOSARTAN POTASSIUM 25 MG/1
25 TABLET ORAL
Qty: 30 TABLET | Refills: 1 | Status: SHIPPED | OUTPATIENT
Start: 2024-10-18

## 2024-10-21 ENCOUNTER — SPECIALTY PHARMACY (OUTPATIENT)
Dept: ENDOCRINOLOGY | Facility: CLINIC | Age: 64
End: 2024-10-21
Payer: MEDICAID

## 2024-10-21 ENCOUNTER — OFFICE VISIT (OUTPATIENT)
Dept: ENDOCRINOLOGY | Facility: CLINIC | Age: 64
End: 2024-10-21
Payer: MEDICAID

## 2024-10-21 VITALS
HEIGHT: 67 IN | HEART RATE: 70 BPM | OXYGEN SATURATION: 98 % | SYSTOLIC BLOOD PRESSURE: 120 MMHG | BODY MASS INDEX: 30.61 KG/M2 | WEIGHT: 195 LBS | DIASTOLIC BLOOD PRESSURE: 60 MMHG

## 2024-10-21 DIAGNOSIS — I10 PRIMARY HYPERTENSION: ICD-10-CM

## 2024-10-21 DIAGNOSIS — E78.2 MIXED HYPERLIPIDEMIA: ICD-10-CM

## 2024-10-21 DIAGNOSIS — I25.10 ATHEROSCLEROSIS OF NATIVE CORONARY ARTERY OF NATIVE HEART WITHOUT ANGINA PECTORIS: ICD-10-CM

## 2024-10-21 DIAGNOSIS — E11.42 DIABETIC PERIPHERAL NEUROPATHY: ICD-10-CM

## 2024-10-21 DIAGNOSIS — E03.9 ACQUIRED HYPOTHYROIDISM: ICD-10-CM

## 2024-10-21 DIAGNOSIS — E11.65 TYPE 2 DIABETES MELLITUS WITH HYPERGLYCEMIA, WITHOUT LONG-TERM CURRENT USE OF INSULIN: Primary | ICD-10-CM

## 2024-10-21 LAB
EXPIRATION DATE: ABNORMAL
GLUCOSE BLDC GLUCOMTR-MCNC: 260 MG/DL (ref 70–130)
Lab: ABNORMAL

## 2024-10-21 PROCEDURE — 99214 OFFICE O/P EST MOD 30 MIN: CPT | Performed by: INTERNAL MEDICINE

## 2024-10-21 PROCEDURE — 3078F DIAST BP <80 MM HG: CPT | Performed by: INTERNAL MEDICINE

## 2024-10-21 PROCEDURE — 82947 ASSAY GLUCOSE BLOOD QUANT: CPT | Performed by: INTERNAL MEDICINE

## 2024-10-21 PROCEDURE — 3046F HEMOGLOBIN A1C LEVEL >9.0%: CPT | Performed by: INTERNAL MEDICINE

## 2024-10-21 PROCEDURE — 3074F SYST BP LT 130 MM HG: CPT | Performed by: INTERNAL MEDICINE

## 2024-10-21 RX ORDER — DULAGLUTIDE 0.75 MG/.5ML
0.75 INJECTION, SOLUTION SUBCUTANEOUS
Qty: 2 ML | Refills: 0 | Status: SHIPPED | OUTPATIENT
Start: 2024-10-21

## 2024-10-21 NOTE — PROGRESS NOTES
Specialty Pharmacy Patient Management Program  Endocrinology Reassessment     Chepe Meneses was referred by an Endocrinology provider to the Endocrinology Patient Management program offered by [facility] for Type 2 Diabetes. A follow-up outreach was conducted, including assessment of continued therapy appropriateness, medication adherence, and side effect incidence and management for Trulicity.    Changes to Insurance Coverage or Financial Support  Medicaid    Relevant Past Medical History and Comorbidities  Relevant medical history and concomitant health conditions were discussed with the patient. The patient's chart has been reviewed for relevant past medical history and comorbid health conditions and updated as necessary.   Past Medical History:   Diagnosis Date    Allergic     Depression     Diabetes mellitus     Hyperlipidemia     Hypertension     Hypothyroidism     Lung cancer     Malignant neoplasm of upper lobe of right lung 02/14/2023    Thyroid disorder     Type 2 diabetes mellitus      Social History     Socioeconomic History    Marital status:    Tobacco Use    Smoking status: Former     Current packs/day: 2.00     Average packs/day: 2.0 packs/day for 2.8 years (5.6 ttl pk-yrs)     Types: Cigarettes     Start date: 01/2022     Quit date: 01/1978    Smokeless tobacco: Never   Vaping Use    Vaping status: Never Used   Substance and Sexual Activity    Alcohol use: Never    Drug use: Never    Sexual activity: Defer          Hospitalizations and Urgent Care Since Last Assessment  ED Visits, Admissions, or Hospitalizations: None  Urgent Office Visits: None    Allergies  Known allergies and reactions were discussed with the patient. The patient's chart has been reviewed for allergy information and updated as necessary.   No Known Allergies  Allergies reviewed by Chelsey Lr, PharmD on 10/21/2024 at  9:49 AM    Relevant Laboratory Values  Relevant laboratory values were  discussed with the patient. The following specialty medication dose adjustment(s) are recommended: Patient requests transition to Trulicity due to previous success.   A1C Last 3 Results          3/27/2024    08:50 7/24/2024    08:21 8/6/2024    15:54   HGBA1C Last 3 Results   Hemoglobin A1C 11.60  11.9  12.3          Details          This result is from an external source.             Lab Results   Component Value Date    HGBA1C 12.3 (H) 08/06/2024     Lab Results   Component Value Date    GLUCOSE 309 (H) 03/27/2024    CALCIUM 9.1 03/27/2024     03/27/2024    K 4.5 03/27/2024    CO2 29.3 (H) 03/27/2024    CL 98 03/27/2024    BUN 14 03/27/2024    CREATININE 1.10 03/27/2024    EGFRIFAFRI 104 10/08/2024    EGFRIFNONA >60 05/30/2022    BCR 12.7 03/27/2024    ANIONGAP 10 05/30/2022     Lab Results   Component Value Date    CHLPL 310 (H) 03/27/2024    TRIG 146 03/27/2024    HDL 59 03/27/2024     (H) 03/27/2024     Microalbumin          3/26/2024    14:50   Microalbumin   Microalbumin, Urine 3.2      Current Medication List  This medication list has been reviewed with the patient and evaluated for any interactions or necessary modifications/recommendations, and updated to include all prescription medications, OTC medications, and supplements the patient is currently taking.  This list reflects what is contained in the patient's profile, which has also been marked as reviewed to communicate to other providers it is the most up to date version of the patient's current medication therapy.     Current Outpatient Medications:     aspirin 81 MG EC tablet, Take 1 tablet by mouth Daily., Disp: , Rfl:     atorvastatin (LIPITOR) 20 MG tablet, TAKE 1 TABLET BY MOUTH AT BEDTIME, Disp: 30 tablet, Rfl: 1    buPROPion SR (WELLBUTRIN SR) 150 MG 12 hr tablet, TAKE 1 TABLET BY MOUTH DAILY WITH BREAKFAST, Disp: 30 tablet, Rfl: 1    diclofenac (VOLTAREN) 50 MG EC tablet, TAKE 1 TABLET BY MOUTH DAILY AT LUNCH, Disp: 30 tablet,  Rfl: 1    Dulaglutide (Trulicity) 0.75 MG/0.5ML solution pen-injector, Inject 0.75 mg under the skin into the appropriate area as directed Every 7 (Seven) Days., Disp: 2 mL, Rfl: 0    DULoxetine (CYMBALTA) 60 MG capsule, TAKE 1 CAPSULE BY MOUTH WITH DINNER, Disp: 30 capsule, Rfl: 1    gabapentin (NEURONTIN) 300 MG capsule, Take 1 capsule by mouth 2 (Two) Times a Day for 30 days., Disp: 60 capsule, Rfl: 0    Jardiance 25 MG tablet tablet, TAKE 1 TABLET BY MOUTH DAILY WITH LUNCH, Disp: 30 tablet, Rfl: 1    levothyroxine (Synthroid) 125 MCG tablet, Take 1 tablet by mouth Every Morning for 90 days., Disp: 90 tablet, Rfl: 0    losartan (COZAAR) 25 MG tablet, TAKE 1 TABLET BY MOUTH DAILY WITH BREAKFAST, Disp: 30 tablet, Rfl: 1    metFORMIN (GLUCOPHAGE) 1000 MG tablet, TAKE 1 TABLET BY MOUTH TWO TIMES A DAY, Disp: 60 tablet, Rfl: 1    metoprolol succinate XL (TOPROL-XL) 25 MG 24 hr tablet, Take 1 tablet by mouth Daily. Patient takes 1/2 tab, Disp: , Rfl:     Medicines reviewed by Chelsey Lr, PharmD on 10/21/2024 at  9:49 AM  Medicines reviewed by Chelsey Lr, PharmD on 10/21/2024 at  9:57 AM    Drug Interactions  No Clinically Significant DDIs Were Identified at Present Time Upon Marking Medications Reviewed      Recommended Medications Assessment  Aspirin: Currently Taking   Statin: Currently Taking   ACEi/ARB: Currently Taking     Adverse Drug Reactions  Medication tolerability: Tolerating with no to minimal ADRs  Medication plan: Discontinued medication (Plan to discontinue Semaglutide and start Trulicity per Patient's request.)  Plan for ADR Management: No ADR    Adherence, Self-Administration, and Current Therapy Problems  Adherence related to the patient's specialty therapy was discussed with the patient. The Adherence segment of this outreach has been reviewed and updated.     Adherence Questions  Linked Medication(s) Assessed: Semaglutide (Ozempic (0.25 or 0.5 MG/DOSE))  On average, how many doses/injections  does the patient miss per month?: 5  What are the identified reasons for non-adherence or missed doses? : lack of perceived benefit, knowledge deficit  What is the estimated medication adherence level?: <70% (States 100%, but patient only filled once and did not get refilled.)  Based on the patient/caregiver response and refill history, does this patient require an MTP to track adherence improvements?: yes    Additional Barriers to Patient Self-Administration: None  Methods for Supporting Patient Self-Administration: Monthly follow up.     Open Medication Therapy Problems  Type 2 diabetes mellitus with hyperglycemia, without long-term current use of insulin   1 Current Medication: Dulaglutide (Trulicity) 0.75 MG/0.5ML solution pen-injector   Current Medication Sig: Inject 0.75 mg under the skin into the appropriate area as directed Every 7 (Seven) Days.   Rationale: Patient forgets to take - Adherence - Adherence   Identified Date: 10/21/2024   Note: 10/21/24: Patient is restarting Trulicity this month. PLan to follow up with patient monthly to ensure taking appropriately as he had stopped taking Ozempic.             Goals of Therapy  Goals related to the patient's specialty therapy were discussed with the patient. The Patient Goals segment of this outreach has been reviewed and updated.   Goals Addressed Today        Specialty Pharmacy General Goal      A1C < 7 %     Lab Results    Component Value Date     HGbA1c  12.3 10/21/24 Patient took Ozempic, but then did not get refilled. Patient would like to switch back to Trulicity. MS    HGBA1C 11.9 (A) 07/24/2024 New enrollment. Starting Ozempic.                    Quality of Life Assessment   Quality of Life related to the patient's enrollment in the patient management program and services provided was discussed with the patient. The QOL segment of this outreach has been reviewed and updated.  Quality of Life Improvement Scale: 8-Moderately better    Reassessment Plan  & Follow-Up  1. Medication Therapy Changes:  Patient's A1c has worsened from previous. Patient took Ozempic, but then did not get refills. Patient requested transition to [facility]. Plan to fill today and follow up in 28 days to increase dosage.   2. Related Plans, Therapy Recommendations, or Issues to Be Addressed: None  3. Pharmacist to perform regular assessments no more than (6) months from the previous assessment.  4. Care Coordinator to set up future refill outreaches, coordinate prescription delivery, and escalate clinical questions to pharmacist.    Attestation  Therapeutic appropriateness: Appropriate   I attest the patient was actively involved in and has agreed to the above plan of care.  If the prescribed therapy is at any point deemed not appropriate based on the current or future assessments, a consultation will be initiated with the patient's specialty care provider to determine the best course of action. The revised plan of therapy will be documented along with any required assessments and/or additional patient education provided.     Chelsey Lr, PharmD, BCCCP, BCPS  Clinical Specialty Pharmacist, Endocrinology  10/21/2024  10:12 EDT    Discussed the aforementioned information with the patient via In-Person.

## 2024-10-21 NOTE — ASSESSMENT & PLAN NOTE
Diabetes is stable.  He was prescribed ozempic.  He took it for a month and then didn't refill it.  Resume GLP-1 RA.  He would prefer to take trulicity.  Will start with low dose and titrate up.  Diabetes will be reassessed in 3 months.

## 2024-10-21 NOTE — PROGRESS NOTES
"     Office Note      Date: 10/21/2024  Patient Name: Chepe Meneses  MRN: [id number]  : 1960    Chief Complaint   Patient presents with    Diabetes     Type II    Hypothyroidism    Hypertension    Hyperlipidemia       History of Present Illness:   Chepe Meneses is a 64 y.o. male who presents for Diabetes type 2. Diagnosed in: . Treated in past with oral agents. Current treatments: ozempic, metformin and jardiance. Number of insulin shots per day: none. Checks blood sugar 1 time a week. Has low blood sugar: no. Aspirin use: Yes. Statin use: Yes. ACE-I/ARB use: Yes.  Change in health since last visit: none. Last eye exam: 2024.     He has h/o hypothyroidism.  He is on T4 125mcg qd.  He is taking this correctly.  He isn't taking any interfering meds concurrently.  He denies any sxs of hypo- or hyperthyroidism at this time.    Subjective      Diabetic Complications:  Eyes: Mild NPDR  Kidneys: No  Feet: Yes - neuropathy - on gabapentin per PCP  Heart: No    Diet and Exercise:  Meals per day: 3  Minutes of exercise per week: 0 mins.    Review of Systems:   Review of Systems   Constitutional:  Positive for fatigue.   Cardiovascular:  Positive for leg swelling.   Gastrointestinal: Negative.    Endocrine: Positive for cold intolerance.       The following portions of the patient's history were reviewed and updated as appropriate: allergies, current medications, past family history, past medical history, past social history, past surgical history, and problem list.    Objective     Visit Vitals  /60 (BP Location: Left arm, Patient Position: Sitting, Cuff Size: Adult)   Pulse 70   Ht 170.2 cm (67\")   Wt 88.5 kg (195 lb)   SpO2 98%   BMI 30.54 kg/m²       Physical Exam:  Physical Exam  Constitutional:       Appearance: Normal appearance.   Neurological:      Mental Status: He is alert.         Labs:    HbA1c  Lab Results   Component Value Date    HGBA1C 12.3 (H) 2024       CMP  Lab Results   Component " Value Date    GLUCOSE 309 (H) 03/27/2024    BUN 14 03/27/2024    CREATININE 1.10 03/27/2024    EGFRIFNONA >60 05/30/2022    EGFRIFAFRI 104 10/08/2024    BCR 12.7 03/27/2024    K 4.5 03/27/2024    CO2 29.3 (H) 03/27/2024    CALCIUM 9.1 03/27/2024    PROTENTOTREF 6.2 03/27/2024    LABIL2 2.3 03/27/2024    AST 12 03/27/2024    ALT 12 03/27/2024        Lipid Panel  Lab Results   Component Value Date    CHLPL 310 (H) 03/27/2024    HDL 59 03/27/2024     (H) 03/27/2024    TRIG 146 03/27/2024        TSH  Lab Results   Component Value Date    TSH 5.540 (H) 07/24/2024    FREET4 2.4 (H) 06/11/2024        Hemoglobin A1C  Lab Results   Component Value Date    HGBA1C 12.3 (H) 08/06/2024        Microalbumin/Creatinine  Lab Results   Component Value Date    MICROALBUR 3.2 03/26/2024           Assessment / Plan      Assessment & Plan:  Diagnoses and all orders for this visit:    1. Type 2 diabetes mellitus with hyperglycemia, without long-term current use of insulin (Primary)  Assessment & Plan:  Diabetes is stable.  He was prescribed ozempic.  He took it for a month and then didn't refill it.  Resume GLP-1 RA.  He would prefer to take trulicity.  Will start with low dose and titrate up.  Diabetes will be reassessed in 3 months.    Orders:  -     POC Glucose, Blood    2. Primary hypertension  Assessment & Plan:  Hypertension is stable and controlled  Continue current treatment regimen.  Blood pressure will be reassessed in 3 months.      3. Mixed hyperlipidemia  Assessment & Plan:  Continue statin.      4. Atherosclerosis of native coronary artery of native heart without angina pectoris  Assessment & Plan:  Continue ASA, statin and SGLT-2 inhibitor.  Resume GLP-1 RA.      5. Acquired hypothyroidism  Assessment & Plan:  Continue T4 tx.  Recent TSH okay.      6. Diabetic peripheral neuropathy    Other orders  -     Dulaglutide (Trulicity) 0.75 MG/0.5ML solution pen-injector; Inject 0.75 mg under the skin into the appropriate  area as directed Every 7 (Seven) Days.  Dispense: 2 mL; Refill: 0      Current Outpatient Medications   Medication Instructions    aspirin 81 MG EC tablet 1 tablet, Daily    atorvastatin (LIPITOR) 20 mg, Oral, Every Night at Bedtime    buPROPion SR (WELLBUTRIN SR) 150 mg, Oral, Daily With Breakfast    diclofenac (VOLTAREN) 50 MG EC tablet TAKE 1 TABLET BY MOUTH DAILY AT LUNCH    DULoxetine (CYMBALTA) 60 MG capsule TAKE 1 CAPSULE BY MOUTH WITH DINNER    gabapentin (NEURONTIN) 300 mg, Oral, 2 Times Daily    Jardiance 25 MG tablet tablet TAKE 1 TABLET BY MOUTH DAILY WITH LUNCH    levothyroxine (SYNTHROID) 125 mcg, Oral, Every Early Morning    losartan (COZAAR) 25 mg, Oral, Daily With Breakfast    metFORMIN (GLUCOPHAGE) 1000 MG tablet TAKE 1 TABLET BY MOUTH TWO TIMES A DAY    metoprolol succinate XL (TOPROL-XL) 25 mg, Daily    Trulicity 0.75 mg, Subcutaneous, Every 7 Days      Return in about 3 months (around 1/21/2025) for Recheck with A1c, TSH - okay to schedule with PA.    Electronically signed by: Layo Boateng MD  10/21/2024

## 2024-10-21 NOTE — PROGRESS NOTES
Specialty Pharmacy Patient Management Program  Endocrinology Initial Fill Outreach      Chepe is a 64 y.o. male contacted today regarding initial fill of his medication(s).    Specialty medication(s) and dose(s) confirmed: Trulicity    Delivery Questions      Flowsheet Row Most Recent Value   Delivery method [employer]   Delivery address verified with patient/caregiver? Yes   Delivery address Home   Number of medications in delivery 1   Medication(s) being filled and delivered --  [T[employer].]   Copay verified? Yes   Copay amount 0   Copay form of payment No copayment ($0)   Ship Date 10/21   Delivery Date 10/22   Signature Required Yes            Follow-Up: 28d    Chelsey Lr, Pharm.D. BCPS, BCCCP  Clinical Specialty Pharmacist, Endocrinology   10:06 EDT  10/21/24

## 2024-11-12 ENCOUNTER — SPECIALTY PHARMACY (OUTPATIENT)
Dept: GENERAL RADIOLOGY | Facility: HOSPITAL | Age: 64
End: 2024-11-12
Payer: MEDICAID

## 2024-11-12 RX ORDER — DULAGLUTIDE 1.5 MG/.5ML
1.5 INJECTION, SOLUTION SUBCUTANEOUS WEEKLY
Qty: 2 ML | Refills: 1 | Status: SHIPPED | OUTPATIENT
Start: 2024-11-12

## 2024-11-12 NOTE — PROGRESS NOTES
Specialty Pharmacy Patient Management Program  Endocrinology Reassessment     Chepe Meneses was referred by an Endocrinology provider to the Endocrinology Patient Management program offered by [facility] for Type 2 Diabetes. A follow-up outreach was conducted, including assessment of continued therapy appropriateness, medication adherence, and side effect incidence and management for Trulicity.    Changes to Insurance Coverage or Financial Support  No changes to insurance    Relevant Past Medical History and Comorbidities  Relevant medical history and concomitant health conditions were discussed with the patient. The patient's chart has been reviewed for relevant past medical history and comorbid health conditions and updated as necessary.   Past Medical History:   Diagnosis Date    Allergic     Depression     Diabetes mellitus     Hyperlipidemia     Hypertension     Hypothyroidism     Lung cancer     Malignant neoplasm of upper lobe of right lung 02/14/2023    Thyroid disorder     Type 2 diabetes mellitus      Social History     Socioeconomic History    Marital status:    Tobacco Use    Smoking status: Former     Current packs/day: 2.00     Average packs/day: 2.0 packs/day for 2.9 years (5.7 ttl pk-yrs)     Types: Cigarettes     Start date: 01/2022     Quit date: 01/1978    Smokeless tobacco: Never   Vaping Use    Vaping status: Never Used   Substance and Sexual Activity    Alcohol use: Never    Drug use: Never    Sexual activity: Defer     Problem list reviewed by Stephen Camejo RPH on 11/12/2024 at  9:47 AM    Hospitalizations and Urgent Care Since Last Assessment  ED Visits, Admissions, or Hospitalizations: None  Urgent Office Visits: None    Allergies  Known allergies and reactions were discussed with the patient. The patient's chart has been reviewed for allergy information and updated as necessary.   No Known Allergies  Allergies reviewed by Stephen Camejo RPH on 11/12/2024  at  9:47 AM    Relevant Laboratory Values  Relevant laboratory values were discussed with the patient. The following specialty medication dose adjustment(s) are recommended: No labs taken today. Virtual visit.  Dose increased per plan via [facility] to Trulicity 1.5mg.    A1C Last 3 Results          3/27/2024    08:50 7/24/2024    08:21 8/6/2024    15:54   HGBA1C Last 3 Results   Hemoglobin A1C 11.60  11.9  12.3          Details          This result is from an external source.             Lab Results   Component Value Date    HGBA1C 12.3 (H) 08/06/2024     Lab Results   Component Value Date    GLUCOSE 309 (H) 03/27/2024    CALCIUM 9.1 03/27/2024     03/27/2024    K 4.5 03/27/2024    CO2 29.3 (H) 03/27/2024    CL 98 03/27/2024    BUN 14 03/27/2024    CREATININE 1.10 03/27/2024    EGFRIFAFRI 104 10/08/2024    EGFRIFNONA >60 05/30/2022    BCR 12.7 03/27/2024    ANIONGAP 10 05/30/2022     Lab Results   Component Value Date    CHLPL 310 (H) 03/27/2024    TRIG 146 03/27/2024    HDL 59 03/27/2024     (H) 03/27/2024     Microalbumin          3/26/2024    14:50   Microalbumin   Microalbumin, Urine 3.2      Current Medication List  This medication list has been reviewed with the patient and evaluated for any interactions or necessary modifications/recommendations, and updated to include all prescription medications, OTC medications, and supplements the patient is currently taking.  This list reflects what is contained in the patient's profile, which has also been marked as reviewed to communicate to other providers it is the most up to date version of the patient's current medication therapy.     Current Outpatient Medications:     aspirin 81 MG EC tablet, Take 1 tablet by mouth Daily., Disp: , Rfl:     atorvastatin (LIPITOR) 20 MG tablet, TAKE 1 TABLET BY MOUTH AT BEDTIME, Disp: 30 tablet, Rfl: 1    buPROPion SR (WELLBUTRIN SR) 150 MG 12 hr tablet, TAKE 1 TABLET BY MOUTH DAILY WITH BREAKFAST, Disp: 30 tablet, Rfl: 1     diclofenac (VOLTAREN) 50 MG EC tablet, TAKE 1 TABLET BY MOUTH DAILY AT LUNCH, Disp: 30 tablet, Rfl: 1    Dulaglutide (Trulicity) 1.5 MG/0.5ML solution auto-injector, Inject 1.5 mg under the skin into the appropriate area as directed 1 (One) Time Per Week., Disp: 2 mL, Rfl: 1    DULoxetine (CYMBALTA) 60 MG capsule, TAKE 1 CAPSULE BY MOUTH WITH DINNER, Disp: 30 capsule, Rfl: 1    gabapentin (NEURONTIN) 300 MG capsule, Take 1 capsule by mouth 2 (Two) Times a Day for 30 days., Disp: 60 capsule, Rfl: 0    Jardiance 25 MG tablet tablet, TAKE 1 TABLET BY MOUTH DAILY WITH LUNCH, Disp: 30 tablet, Rfl: 1    levothyroxine (Synthroid) 125 MCG tablet, Take 1 tablet by mouth Every Morning for 90 days., Disp: 90 tablet, Rfl: 0    losartan (COZAAR) 25 MG tablet, TAKE 1 TABLET BY MOUTH DAILY WITH BREAKFAST, Disp: 30 tablet, Rfl: 1    metFORMIN (GLUCOPHAGE) 1000 MG tablet, TAKE 1 TABLET BY MOUTH TWO TIMES A DAY, Disp: 60 tablet, Rfl: 1    metoprolol succinate XL (TOPROL-XL) 25 MG 24 hr tablet, Take 1 tablet by mouth Daily. Patient takes 1/2 tab, Disp: , Rfl:     Medicines reviewed by Stephen Camejo [facility] on 11/12/2024 at  9:47 AM    Drug Interactions  No Clinically Significant DDIs Were Identified at Present Time Upon Marking Medications Reviewed      Recommended Medications Assessment  Aspirin: Currently Taking   Statin: Currently Taking   ACEi/ARB: Currently Taking     Adverse Drug Reactions  Medication tolerability: Tolerating with no to minimal ADRs  Medication plan: Continue therapy with normal follow-up  Plan for ADR Management: No adverse drug reactions reported.     Adherence, Self-Administration, and Current Therapy Problems  Adherence related to the patient's specialty therapy was discussed with the patient. The Adherence segment of this outreach has been reviewed and updated.     Adherence Questions  Linked Medication(s) Assessed: Dulaglutide (Trulicity)  On average, how many doses/injections does the patient miss per  month?: 0  What are the identified reasons for non-adherence or missed doses? : no problems identified  What is the estimated medication adherence level?: %  Based on the patient/caregiver response and refill history, does this patient require an MTP to track adherence improvements?: no    Additional Barriers to Patient Self-Administration: None  Methods for Supporting Patient Self-Administration: n/a    Open Medication Therapy Problems  Type 2 diabetes mellitus with hyperglycemia, without long-term current use of insulin   1 Current Medication: Dulaglutide (Trulicity) 0.75 MG/0.5ML solution pen-injector (Discontinued)   Current Medication Sig: Inject 0.75 mg under the skin into the appropriate area as directed Every 7 (Seven) Days.   Rationale: Patient forgets to take - Adherence - Adherence   Identified Date: 10/21/2024   Note: 10/21/24: Patient is restarting Trulicity this month. PLan to follow up with patient monthly to ensure taking appropriately as he had stopped taking Ozempic.  11/12/2024:  After restarting Trulicity patient has been taking as prescribed.  Patient informed me that he has not missed a dose.  Will follow up in one month to determine if taking appropriately for a longer term, then close MTP. RS             Goals of Therapy  Goals related to the patient's specialty therapy were discussed with the patient. The Patient Goals segment of this outreach has been reviewed and updated.   Goals Addressed Today        Specialty Pharmacy General Goal      A1C < 7 %     Lab Results    Component Value Date     Virtual  11/12/2024 Patient was seen today via virtual visit.  Patient tolerating medication well.  No issues.  Rakesh on track today.  Will follow up in one month to determine adherence on longer term.  RS    HGbA1c  12.3 10/21/24 Patient took Ozempic, but then did not get refilled. Patient would like to switch back to Trulicity. MS    HGBA1C 11.9 (A) 07/24/2024 New enrollment. Starting Ozempic.                     Quality of Life Assessment   Quality of Life related to the patient's enrollment in the patient management program and services provided was discussed with the patient. The QOL segment of this outreach has been reviewed and updated.  Quality of Life Improvement Scale: 8-Moderately better    Reassessment Plan & Follow-Up  1. Medication Therapy Changes: Increased dose to Trulicity 1.5 mg per plan via [facility].  2. Related Plans, Therapy Recommendations, or Issues to Be Addressed: Will follow up in one month to reassess adherence MTP to make sure patient is doing well on a longer term.  If continuing to do well, will close MTP at that time.  Will also follow up at next appointment for further reassessment.    3. Pharmacist to perform regular assessments no more than (6) months from the previous assessment.  4. Care Coordinator to set up future refill outreaches, coordinate prescription delivery, and escalate clinical questions to pharmacist.    Attestation  Therapeutic appropriateness: Appropriate   I attest the patient was actively involved in and has agreed to the above plan of care.  If the prescribed therapy is at any point deemed not appropriate based on the current or future assessments, a consultation will be initiated with the patient's specialty care provider to determine the best course of action. The revised plan of therapy will be documented along with any required assessments and/or additional patient education provided.     Silviano Camejo Pharm.D.   Clinical Specialty Pharmacist, Endocrinology  11/12/2024  10:02 EST

## 2024-11-12 NOTE — PROGRESS NOTES
Specialty Pharmacy Patient Management Program  Endocrinology Refill Outreach      Chepe is a 64 y.o. male contacted today regarding refills of his medication(s).    Specialty medication(s) and dose(s) confirmed: Trulicity    Refill Questions      Flowsheet Row Most Recent Value   Changes to allergies? No   Changes to medications? Yes   [Change in strength.  Patient and pharmacist aware.]   New conditions or infections since last clinic visit No   Unplanned office visit, urgent care, ED, or hospital admission in the last 4 weeks  No   How does patient/caregiver feel medication is working? Very good   Financial problems or insurance changes  No   Since the previous refill, were any specialty medication doses or scheduled injections missed or delayed?  No   Does this patient require a clinical escalation to a pharmacist? No          Delivery Questions      Flowsheet Row Most Recent Value   Delivery method UPS   Delivery address verified with patient/caregiver? Yes   Delivery address Home   Number of medications in delivery 1   Medication(s) being filled and delivered Dulaglutide (Trulicity)   Doses left of specialty medications 1 dose   Copay verified? Yes   Copay amount $0.00   Copay form of payment No copayment ($0)   Ship Date 11/12/2024   Delivery Date 11/13/2024   Signature Required Yes            Follow-Up: 28 days    Stephen Camejo Pharm.D.  Clinical Specialty Pharmacist, Endocrinology  10:02 EST 11/12/24

## 2024-11-12 NOTE — PROGRESS NOTES
Specialty Pharmacy Patient Management Program  Per Protocol Prescription Order/Refill     Patient currently fills medications at [facility] and is enrolled in an Endocrinology Patient Management Program.     During outreach, patient stated that he is tolerating Trulicity 0.75 mg well, experiencing no side effects.  Patient interested in increasing to 1.5 mg.  Sent in Trulicity 1.5 mg  per plan.       Requested Prescriptions     Pending Prescriptions Disp Refills    Dulaglutide (Trulicity) 1.5 MG/0.5ML solution auto-injector 2 mL 1     Sig: Inject 1.5 mg under the skin into the appropriate area as directed 1 (One) Time Per Week.     Prescription orders above were sent to the pharmacy per Collaborative Care Agreement Protocol.     Last Office Visit: 10/21/2024  Next Office Visit: 01/22/2025    Stephen Camejo Pharm.D.  Clinical Specialty Pharmacist, Endocrinology  09:33 EST 11/12/24

## 2024-12-13 ENCOUNTER — SPECIALTY PHARMACY (OUTPATIENT)
Dept: GENERAL RADIOLOGY | Facility: HOSPITAL | Age: 64
End: 2024-12-13
Payer: MEDICAID

## 2024-12-13 NOTE — PROGRESS NOTES
Specialty Pharmacy Patient Management Program  Endocrinology Refill Outreach      Chepe is a 64 y.o. male contacted today regarding refills of his medication(s).    Specialty medication(s) and dose(s) confirmed: Trulicity    Refill Questions      Flowsheet Row Most Recent Value   Changes to allergies? No   Changes to medications? No   New conditions or infections since last clinic visit No   Unplanned office visit, urgent care, ED, or hospital admission in the last 4 weeks  No   How does patient/caregiver feel medication is working? Very good   Financial problems or insurance changes  No   Since the previous refill, were any specialty medication doses or scheduled injections missed or delayed?  No   Does this patient require a clinical escalation to a pharmacist? No          Delivery Questions      Flowsheet Row Most Recent Value   Delivery method UPS   Delivery address verified with patient/caregiver? Yes   Delivery address Home   Number of medications in delivery 1   Medication(s) being filled and delivered Dulaglutide (Trulicity)   Doses left of specialty medications 1 week   Copay verified? Yes   Copay amount $0   Copay form of payment No copayment ($0)   Ship Date 12/16/2024   Delivery Date 12/17/2024   Signature Required Yes            Follow-Up: 28 Days    Stephen Camejo Pharm.D.  Clinical Specialty Pharmacist, Endocrinology  09:35 EST 12/13/24

## 2024-12-13 NOTE — PROGRESS NOTES
Specialty Pharmacy Patient Management Program  One-Time Clinical Outreach     Chepe Meneses is a 64 y.o. male seen by an Endocrinology provider for Type 2 Diabetes and enrolled in the Endocrinology Patient Management program offered by [facility].      Open Medication Therapy Problems  Type 2 diabetes mellitus with hyperglycemia, without long-term current use of insulin   1 Current Medication: Dulaglutide (Trulicity) 0.75 MG/0.5ML solution pen-injector (Discontinued)   Current Medication Sig: Inject 0.75 mg under the skin into the appropriate area as directed Every 7 (Seven) Days.   Rationale: Patient forgets to take - Adherence - Adherence   Identified Date: 10/21/2024   Note: 10/21/24: Patient is restarting Trulicity this month. PLan to follow up with patient monthly to ensure taking appropriately as he had stopped taking Ozempic.  11/12/2024:  After restarting Trulicity patient has been taking as prescribed.  Patient informed me that he has not missed a dose.  Will follow up in one month to determine if taking appropriately for a longer term, then close MTP. RS  12/13/2024:  Followed up with patient.  He is taking properly.  He states he is not forgetting his medication.  Refilled medication today.  Will close MTP. RS             Silviano Camejo, PharmD  Clinical Specialty Pharmacist, Endocrinology  12/13/2024  09:35 EST

## 2025-01-21 ENCOUNTER — SPECIALTY PHARMACY (OUTPATIENT)
Dept: GENERAL RADIOLOGY | Facility: HOSPITAL | Age: 65
End: 2025-01-21
Payer: MEDICAID

## 2025-01-21 RX ORDER — DULAGLUTIDE 1.5 MG/.5ML
1.5 INJECTION, SOLUTION SUBCUTANEOUS WEEKLY
Qty: 2 ML | Refills: 1 | Status: SHIPPED | OUTPATIENT
Start: 2025-01-21 | End: 2025-01-22 | Stop reason: DRUGHIGH

## 2025-01-21 NOTE — PROGRESS NOTES
Specialty Pharmacy Patient Management Program  Per Protocol Prescription Order/Refill     Patient currently fills medications at [facility] and is enrolled in an Endocrinology Patient Management Program.     Requested Prescriptions     Pending Prescriptions Disp Refills    Dulaglutide (Trulicity) 1.5 MG/0.5ML solution auto-injector 2 mL 1     Sig: Inject 1.5 mg under the skin into the appropriate area as directed 1 (One) Time Per Week.     Prescription orders above were sent to the pharmacy per Collaborative Care Agreement Protocol.     Patient needed refill and wanted to stay with the Trulicity 1.5 mg.     Stephen Camejo Pharm.D.  Clinical Specialty Pharmacist, Endocrinology  14:10 EST 01/21/25

## 2025-01-21 NOTE — PROGRESS NOTES
Specialty Pharmacy Patient Management Program  Endocrinology Refill Outreach      Chepe is a 64 y.o. male contacted today regarding refills of his medication(s).    Specialty medication(s) and dose(s) confirmed: Trulicity    Refill Questions      Flowsheet Row Most Recent Value   Changes to allergies? No   Changes to medications? No   New conditions or infections since last clinic visit No   Unplanned office visit, urgent care, ED, or hospital admission in the last 4 weeks  No   How does patient/caregiver feel medication is working? Very good   Financial problems or insurance changes  No   Since the previous refill, were any specialty medication doses or scheduled injections missed or delayed?  No   Does this patient require a clinical escalation to a pharmacist? No          Delivery Questions      Flowsheet Row Most Recent Value   Delivery method UPS   Delivery address verified with patient/caregiver? Yes   Delivery address Home   Number of medications in delivery 1   Medication(s) being filled and delivered Dulaglutide (Trulicity)   Doses left of specialty medications needs next dose   Copay verified? Yes   Copay amount $0   Copay form of payment No copayment ($0)   Ship Date 01/22/2025   Delivery Date Selection 01/23/25   Signature Required Yes            Follow-Up: 28 days    Stephen Camejo Pharm.D.  Clinical Specialty Pharmacist, Endocrinology  14:17 EST 01/21/25

## 2025-01-22 ENCOUNTER — SPECIALTY PHARMACY (OUTPATIENT)
Dept: ENDOCRINOLOGY | Facility: CLINIC | Age: 65
End: 2025-01-22
Payer: MEDICAID

## 2025-01-22 ENCOUNTER — OFFICE VISIT (OUTPATIENT)
Dept: ENDOCRINOLOGY | Facility: CLINIC | Age: 65
End: 2025-01-22
Payer: MEDICAID

## 2025-01-22 VITALS
OXYGEN SATURATION: 99 % | WEIGHT: 198 LBS | DIASTOLIC BLOOD PRESSURE: 80 MMHG | SYSTOLIC BLOOD PRESSURE: 142 MMHG | BODY MASS INDEX: 31.08 KG/M2 | HEIGHT: 67 IN | HEART RATE: 76 BPM

## 2025-01-22 DIAGNOSIS — E11.65 TYPE 2 DIABETES MELLITUS WITH HYPERGLYCEMIA, WITHOUT LONG-TERM CURRENT USE OF INSULIN: Primary | ICD-10-CM

## 2025-01-22 DIAGNOSIS — E03.9 ACQUIRED HYPOTHYROIDISM: ICD-10-CM

## 2025-01-22 LAB
EXPIRATION DATE: ABNORMAL
EXPIRATION DATE: ABNORMAL
GLUCOSE BLDC GLUCOMTR-MCNC: 345 MG/DL (ref 70–130)
HBA1C MFR BLD: 11.7 % (ref 4.5–5.7)
Lab: ABNORMAL
Lab: ABNORMAL

## 2025-01-22 PROCEDURE — 3077F SYST BP >= 140 MM HG: CPT | Performed by: PHYSICIAN ASSISTANT

## 2025-01-22 PROCEDURE — 83036 HEMOGLOBIN GLYCOSYLATED A1C: CPT | Performed by: PHYSICIAN ASSISTANT

## 2025-01-22 PROCEDURE — 82947 ASSAY GLUCOSE BLOOD QUANT: CPT | Performed by: PHYSICIAN ASSISTANT

## 2025-01-22 PROCEDURE — 99214 OFFICE O/P EST MOD 30 MIN: CPT | Performed by: PHYSICIAN ASSISTANT

## 2025-01-22 PROCEDURE — 3079F DIAST BP 80-89 MM HG: CPT | Performed by: PHYSICIAN ASSISTANT

## 2025-01-22 PROCEDURE — 3046F HEMOGLOBIN A1C LEVEL >9.0%: CPT | Performed by: PHYSICIAN ASSISTANT

## 2025-01-22 RX ORDER — LEVOTHYROXINE SODIUM 125 UG/1
125 TABLET ORAL
Qty: 90 TABLET | Refills: 1 | Status: SHIPPED | OUTPATIENT
Start: 2025-01-22

## 2025-01-22 RX ORDER — ACYCLOVIR 400 MG/1
1 TABLET ORAL CONTINUOUS
Qty: 1 EACH | Refills: 0 | Status: SHIPPED | OUTPATIENT
Start: 2025-01-22

## 2025-01-22 RX ORDER — DULAGLUTIDE 3 MG/.5ML
3 INJECTION, SOLUTION SUBCUTANEOUS WEEKLY
Qty: 2 ML | Refills: 5 | Status: SHIPPED | OUTPATIENT
Start: 2025-01-22

## 2025-01-22 RX ORDER — DULAGLUTIDE 1.5 MG/.5ML
1.5 INJECTION, SOLUTION SUBCUTANEOUS WEEKLY
Qty: 2 ML | Refills: 1 | Status: CANCELLED | OUTPATIENT
Start: 2025-01-22

## 2025-01-22 RX ORDER — ACYCLOVIR 400 MG/1
1 TABLET ORAL
Qty: 3 EACH | Refills: 5 | Status: SHIPPED | OUTPATIENT
Start: 2025-01-22

## 2025-01-22 NOTE — PROGRESS NOTES
Office Note      Date: 2025  Patient Name: Chepe Meneses  MRN: [id number]  : 1960    Chief Complaint   Patient presents with    Diabetes     Type 2 diabetes mellitus with hyperglycemia, without long-term current use of insulin    Hypothyroidism       History of Present Illness:   Chepe Meneses is a 64 y.o. male who presents for Diabetes type 2.   Diagnosed:   Current RX:  trulicity 1.5 mg weekly, metformin and jardiance 25 mg    Bg checks are done: rarely; always 250+  Hypoglycemia :none    Patient does not pay attention to his diet, eats whatever he wants. He has done diabetes education and knows what he should be eating but does not. Since his cancer treatment he has mainly been eating comfort food    Had a CGM years ago but has not used 1 in years.  Would be willing to try this again.    He has h/o hypothyroidism.  He is on T4 125mcg qd. Has not been taking thyroid medication for about 2 weeks. He was taking this correctly.  He isn't taking any interfering meds concurrently.  He denies any sxs of hypo- or hyperthyroidism at this time.     Last A1c:  Hemoglobin A1C   Date Value Ref Range Status   2025 11.7 (A) 4.5 - 5.7 % Final   2024 12.3 (H) <5.7 % Final       Changes in health since last visit: None.     DM Health Maintenance:  Ophtho: 2024  Monofilament / Foot exam: 24  Lipids/Statin: taking a statin with last FLP showing  3/27/24  MEAGHAN: 25   TSH: 25   Aspirin: taking  ACE/ARB: yes     Diabetic Complications:  Eyes: Mild NPDR  Kidneys: No  Feet: Yes - neuropathy - on gabapentin per PCP  Heart: No      Subjective          Review of Systems:   Review of Systems   Constitutional:  Positive for fatigue. Negative for activity change and appetite change.   Respiratory:  Negative for chest tightness and shortness of breath.    Gastrointestinal:  Negative for abdominal pain.   Musculoskeletal:  Negative for myalgias.   Neurological:  Negative for numbness.  "  Psychiatric/Behavioral:  The patient is not nervous/anxious.        The following portions of the patient's history were reviewed and updated as appropriate: allergies, current medications, past family history, past medical history, past social history, past surgical history, and problem list.    Objective     Visit Vitals  /80 (BP Location: Left arm, Patient Position: Sitting, Cuff Size: Adult)   Pulse 76   Ht 170.2 cm (67\")   Wt 89.8 kg (198 lb)   SpO2 99%   BMI 31.01 kg/m²           Physical Exam:  Physical Exam  Constitutional:       Appearance: He is well-developed.   HENT:      Head: Normocephalic and atraumatic.      Right Ear: External ear normal.      Left Ear: External ear normal.   Eyes:      Conjunctiva/sclera: Conjunctivae normal.   Cardiovascular:      Rate and Rhythm: Normal rate and regular rhythm.   Pulmonary:      Effort: Pulmonary effort is normal.      Breath sounds: Normal breath sounds.   Musculoskeletal:         General: Normal range of motion.      Cervical back: Normal range of motion.   Skin:     General: Skin is warm and dry.   Psychiatric:         Behavior: Behavior normal.          Assessment / Plan      Assessment & Plan:  Diagnoses and all orders for this visit:    1. Type 2 diabetes mellitus with hyperglycemia, without long-term current use of insulin (Primary)  Assessment & Plan:  Diabetes is improving with treatment.   Medication changes per orders.  Reminded to bring in blood sugar diary at next visit.  Recommended an ADA diet.  Regular aerobic exercise.    Increase Trulicity to 3 mg weekly.    Will start long-acting insulin, Lantus 10 units daily.  Gave patient instructions about titrating the dose up based on morning/fasting glucose readings.  He will increase dose by 2 units every 3 days until morning readings are below 180.  [facility] staff will help with increasing the dose and checking in with patient.    Sent in Dexcom G7 sensor and .  Patient will " call if he needs any assistance with starting this.    Gave patient order for fasting screening labs.  He will have this done when he goes for recheck of thyroid levels in about 8 weeks.    Diabetes will be reassessed in 3 months    Orders:  -     POC Glucose, Blood  -     POC Glycosylated Hemoglobin (Hb A1C)  -     Discontinue: Insulin Glargine (LANTUS SOLOSTAR) 100 UNIT/ML injection pen; Inject 10 Units under the skin into the appropriate area as directed Daily. Increase as directed. MDD 30 units  Dispense: 27 mL; Refill: 1  -     Comprehensive Metabolic Panel; Future  -     Lipid Panel; Future  -     Microalbumin / Creatinine Urine Ratio - Urine, Clean Catch; Future  -     Continuous Glucose Sensor (Dexcom G7 Sensor) misc; Use 1 Device Every 10 (Ten) Days.  Dispense: 3 each; Refill: 5  -     Continuous Glucose  (Dexcom G7 ) device; Use 1 Device Continuous.  Dispense: 1 each; Refill: 0  -     Dulaglutide (Trulicity) 3 MG/0.5ML solution auto-injector; Inject 3 mg under the skin into the appropriate area as directed 1 (One) Time Per Week.  Dispense: 2 mL; Refill: 5  -     Insulin Glargine (LANTUS SOLOSTAR) 100 UNIT/ML injection pen; Inject 10 Units under the skin into the appropriate area as directed Daily. Increase as directed. MDD 30 units  Dispense: 27 mL; Refill: 1    2. Acquired hypothyroidism  Assessment & Plan:  Patient has been off levothyroxine for about 2 weeks.  Will have him restart his previous dose of 125 mcg daily.  Gave patient lab orders to have thyroid levels rechecked in about 8 weeks.  Further recommendations regarding dose adjustments and lab rechecks based on results.    Orders:  -     T4, Free; Future  -     TSH; Future    Other orders  -     levothyroxine (Synthroid) 125 MCG tablet; Take 1 tablet by mouth Every Morning.  Dispense: 90 tablet; Refill: 1        Return in about 3 months (around 4/22/2025) for Follow up with Dr. Boateng.    Portions of this note were completed with  voice recognition program.  Electronically signed by Danica Cruz PA-C  [facility]  01/22/2025

## 2025-01-22 NOTE — PROGRESS NOTES
Specialty Pharmacy Patient Management Program  Endocrinology Refill Outreach      Chepe is a 64 y.o. male contacted today regarding refills of his medication(s).    Specialty medication(s) and dose(s) confirmed: Trulicity, Lantus    Refill Questions      Flowsheet Row Most Recent Value   Changes to allergies? No   Changes to medications? No   New conditions or infections since last clinic visit No   Unplanned office visit, urgent care, ED, or hospital admission in the last 4 weeks  No   How does patient/caregiver feel medication is working? Very good   Financial problems or insurance changes  No   Since the previous refill, were any specialty medication doses or scheduled injections missed or delayed?  No   Does this patient require a clinical escalation to a pharmacist? No          Delivery Questions      Flowsheet Row Most Recent Value   Delivery method UPS   Delivery address verified with patient/caregiver? Yes   Delivery address Home   Number of medications in delivery 5   Medication(s) being filled and delivered Dulaglutide (Trulicity), Continuous Glucose Sensor (Dexcom G7 Sensor), Continuous Glucose  (Dexcom G7 ), Levothyroxine Sodium (SYNTHROID, LEVOTHROID), Insulin Glargine (LANTUS SOLOSTAR)   Copay verified? Yes   Copay amount 0   Copay form of payment No copayment ($0)   Ship Date 1/22   Delivery Date Selection 01/23/25   Signature Required Yes            Follow-Up: 30d    Chelsey Lr, Pharm.D. BCPS, BCCCP  Clinical Specialty Pharmacist, Endocrinology   09:51 EST  01/22/25

## 2025-01-22 NOTE — ASSESSMENT & PLAN NOTE
Diabetes is improving with treatment.   Medication changes per orders.  Reminded to bring in blood sugar diary at next visit.  Recommended an ADA diet.  Regular aerobic exercise.    Increase Trulicity to 3 mg weekly.    Will start long-acting insulin, Lantus 10 units daily.  Gave patient instructions about titrating the dose up based on morning/fasting glucose readings.  He will increase dose by 2 units every 3 days until morning readings are below 180.  [facility] pharmacy staff will help with increasing the dose and checking in with patient.    Sent in Dexcom G7 sensor and .  Patient will call if he needs any assistance with starting this.    Gave patient order for fasting screening labs.  He will have this done when he goes for recheck of thyroid levels in about 8 weeks.    Diabetes will be reassessed in 3 months

## 2025-01-22 NOTE — PROGRESS NOTES
Specialty Pharmacy Patient Management Program  Endocrinology Medication Addition Assessment     Chepe Meneses was referred by an Endocrinology provider to the Endocrinology Patient Management Program offered by [facility] for Type 2 Diabetes. This assessment was conducted as a result of a specialty medication addition or substitution. The patient was previously introduced to services offered by [facility], including: regular assessments, refill coordination, curbside pick-up or mail order delivery options, prior authorization maintenance, and financial assistance programs as applicable. The patient was also provided with contact information for the pharmacy team.      An initial outreach was conducted, including assessment of therapy appropriateness and specialty medication education for Insulin Glargine.     A follow-up outreach was conducted, including assessment of continued therapy appropriateness, medication adherence, and side effect incidence and management for Trulicity.    Insurance Coverage & Financial Support  Medicaid     Relevant Past Medical History and Comorbidities  Relevant medical history and concomitant health conditions were discussed with the patient. The patient's chart has been reviewed for relevant past medical history and comorbid conditions and updated as necessary.  Past Medical History:   Diagnosis Date    Allergic     Depression     Diabetes mellitus     Hyperlipidemia     Hypertension     Hypothyroidism     Lung cancer     Malignant neoplasm of upper lobe of right lung 02/14/2023    Thyroid disorder     Type 2 diabetes mellitus      Social History     Socioeconomic History    Marital status:    Tobacco Use    Smoking status: Former     Current packs/day: 2.00     Average packs/day: 2.0 packs/day for 3.1 years (6.1 ttl pk-yrs)     Types: Cigarettes     Start date: 01/2022     Quit date: 01/1978    Smokeless tobacco: Never   Vaping  Use    Vaping status: Never Used   Substance and Sexual Activity    Alcohol use: Never    Drug use: Never    Sexual activity: Defer       Problem list reviewed by Chelsey Lr PharmD on 1/22/2025 at  9:46 AM    Hospitalizations and Urgent Care Since Last Assessment  ED Visits, Admissions, or Hospitalizations: None  Urgent Office Visits: None.    Allergies  Known allergies and reactions were discussed with the patient. The patient's chart has been reviewed for  allergy information and updated as necessary.   No Known Allergies    Allergies reviewed by Chelsey Lr PharmD on 1/22/2025 at  9:46 AM    Relevant Laboratory Values  Relevant laboratory values were discussed with the patient. The following specialty medication dose adjustment(s) are recommended: Add basal insulin, increase Trulicity  A1C Last 3 Results          7/24/2024    08:21 8/6/2024    15:54 1/22/2025    08:21   HGBA1C Last 3 Results   Hemoglobin A1C 11.9  12.3     11.7       Details          This result is from an external source.             Lab Results   Component Value Date    HGBA1C 11.7 (A) 01/22/2025     Lab Results   Component Value Date    GLUCOSE 309 (H) 03/27/2024    CALCIUM 9.1 03/27/2024     03/27/2024    K 4.5 03/27/2024    CO2 29.3 (H) 03/27/2024    CL 98 03/27/2024    BUN 14 03/27/2024    CREATININE 1.10 03/27/2024    EGFRIFAFRI 104 10/08/2024    EGFRIFNONA >60 05/30/2022    BCR 12.7 03/27/2024    ANIONGAP 10 05/30/2022     Lab Results   Component Value Date    CHLPL 310 (H) 03/27/2024    TRIG 146 03/27/2024    HDL 59 03/27/2024     (H) 03/27/2024     Microalbumin          3/26/2024    14:50   Microalbumin   Microalbumin, Urine 3.2        Current Medication List  This medication list has been reviewed with the patient and evaluated for any interactions or necessary modifications/recommendations, and updated to include all prescription medications, OTC medications, and supplements the patient is currently taking.  This  list reflects what is contained in the patient's profile, which has also been marked as reviewed to communicate to other providers it is the most up to date version of the patient's current medication therapy.     Current Outpatient Medications:     aspirin 81 MG EC tablet, Take 1 tablet by mouth Daily., Disp: , Rfl:     atorvastatin (LIPITOR) 20 MG tablet, TAKE 1 TABLET BY MOUTH AT BEDTIME, Disp: 30 tablet, Rfl: 1    buPROPion SR (WELLBUTRIN SR) 150 MG 12 hr tablet, TAKE 1 TABLET BY MOUTH DAILY WITH BREAKFAST, Disp: 30 tablet, Rfl: 1    Continuous Glucose  (Dexcom G7 ) device, Use 1 Device Continuous., Disp: 1 each, Rfl: 0    Continuous Glucose Sensor (Dexcom G7 Sensor) misc, Use 1 Device Every 10 (Ten) Days., Disp: 3 each, Rfl: 5    diclofenac (VOLTAREN) 50 MG EC tablet, TAKE 1 TABLET BY MOUTH DAILY AT LUNCH, Disp: 30 tablet, Rfl: 1    Dulaglutide (Trulicity) 3 MG/0.5ML solution auto-injector, Inject 3 mg under the skin into the appropriate area as directed 1 (One) Time Per Week., Disp: 2 mL, Rfl: 5    DULoxetine (CYMBALTA) 60 MG capsule, TAKE 1 CAPSULE BY MOUTH WITH DINNER, Disp: 30 capsule, Rfl: 1    gabapentin (NEURONTIN) 300 MG capsule, Take 1 capsule by mouth 2 (Two) Times a Day for 30 days., Disp: 60 capsule, Rfl: 0    Insulin Glargine (LANTUS SOLOSTAR) 100 UNIT/ML injection pen, Inject 10 Units under the skin into the appropriate area as directed Daily. Increase as directed. MDD 30 units, Disp: 27 mL, Rfl: 1    Jardiance 25 MG tablet tablet, TAKE 1 TABLET BY MOUTH DAILY WITH LUNCH, Disp: 30 tablet, Rfl: 1    levothyroxine (Synthroid) 125 MCG tablet, Take 1 tablet by mouth Every Morning., Disp: 90 tablet, Rfl: 1    losartan (COZAAR) 25 MG tablet, TAKE 1 TABLET BY MOUTH DAILY WITH BREAKFAST, Disp: 30 tablet, Rfl: 1    metFORMIN (GLUCOPHAGE) 1000 MG tablet, TAKE 1 TABLET BY MOUTH TWO TIMES A DAY, Disp: 60 tablet, Rfl: 1    metoprolol succinate XL (TOPROL-XL) 25 MG 24 hr tablet, Take 1 tablet by  mouth Daily. Patient takes 1/2 tab, Disp: , Rfl:     Medicines reviewed by Chelsey Lr, Kristina on 1/22/2025 at  9:46 AM    Drug Interactions  No Clinically Significant DDIs Were Identified at Present Time Upon Marking Medications Reviewed      Recommended Medications Assessment  Aspirin: Currently Taking   Statin: Currently Taking   ACEi/ARB: Currently Taking     Initial Education Provided for Specialty Medication  The patient has been provided with the following education and any applicable administration techniques (i.e. self-injection) have been demonstrated for the therapies indicated. All questions and concerns have been addressed prior to the patient receiving the medication, and the patient has verbalized comprehension of the education and any materials provided. Additional patient education shall be provided and documented upon request by the patient, provider, or payer.    insulin glargine  Medication Expectations    Why am I taking this medication?  You are taking this medication to lower blood sugar and A1c because you have type 1 or type 2 diabetes. Diabetes is not curable but with proper medication and treatment, we can keep your blood sugar within your personalized target range.    What should I expect while on this medication?  You should expect to see your blood sugar and A1c decrease over time.    How does the medication work?  Insulin glargine (Basaglar, Lantus, Semglee, etc.) is a long-acting insulin that releases slowly and continuously in the body. Insulin helps the sugar in your blood get into cells and provide them with energy to fuel your body.     How long will I be on this medication for?  If you have Type 1 diabetes, you will be on this medication or another insulin throughout your lifetime because your body cannot make its own insulin. If you have Type 2 diabetes, the amount of time you will be on this medication will be determined by your doctor based on blood sugar and A1c control.  You will most likely be on this medication or another diabetes medication throughout your lifetime because your body does not make enough insulin. Do not abruptly stop this medication without talking to your doctor first.     How do I take this medication?  Take as directed on your prescription label. Insulin glargine is usually given once or twice daily and can be taken with or without food. Insulin glargine  is injected under the skin (subcutaneously) of your stomach, thigh, buttocks, or upper arm. Use a different injection site in the same body region each day.     What are some possible side effects?  Insulin glargine  may cause low blood sugar (hypoglycemia). Signs and symptoms that may indicate hypoglycemia include dizziness, sweating, anxiety, irritability, confusion, or headache.    What happens if I miss a dose?  If you miss a dose, take it as soon as you remember. If it is close to your next dose, skip it. Never take 2 doses at once.       Medication Safety    What are things I should warn my doctor immediately about?  Tell your doctor if you have kidney or liver problems. Talk to your doctor if you are pregnant, planning to become pregnant, or breastfeeding. Also tell your doctor if you notice any signs/symptoms of an allergic reaction (rash, hives, difficulty breathing, etc.).    What are things that I should be cautious of?  Be cautious of any side effects from this medication. Talk to your doctor if any new ones develop or aren't getting better.    What are some medications that can interact with this one?  Do not take this medication with rosiglitazone or macimorelin. Taking insulin glargine with other medications that lower your blood sugar may increase the risk of hypoglycemia. Your doctor may reduce the dose of these medications when you start insulin glargine  Always tell your doctor or pharmacist immediately if you start taking any new medications, including over-the-counter medications,  vitamins, and herbal supplements.        Medication Storage/Handling    How should I handle this medication?  Keep this medication out of reach of pets/children and keep the pen capped when not in use. Do not share your medicine with others.     How does this medication need to be stored?  Store your new, unused pens in the original carton in the refrigerator. Protect it from light. Do not freeze. Always remove the needle and place the cap on the pen before storing.     How should I dispose of this medication?  Used insulin glargine  pens should be thrown away after 28 days even if insulin remains.?Place your used pen and needle in an approved sharps container?If your doctor decides to stop this medication, take to your local police station for proper disposal. Some pharmacies also have?take-back bins for medication drop-off.??       Resources/Support    How can I remind myself to take this medication?  You can download reminder apps to help you manage your refills or set an alarm on your phone to remind you.     Is financial support available?   Manufacturers of insulin glargine  can provide co-pay cards if you have commercial insurance or patient assistance if you have Medicare or no insurance.     Which vaccines are recommended for me?  Talk to your doctor about these vaccines: Flu, Coronavirus (COVID-19), Pneumococcal (pneumonia), Tdap, Hepatitis B, Zoster (shingles)            Adherence, Self-Administration, and Current Therapy Problems  Adherence related to the patient's specialty therapy was discussed with the patient. The Adherence segment of this outreach has been reviewed and updated.     Is there a concern with patient's ability to self administer the medication correctly and without issue?: No  Were any potential barriers to adherence identified during the initial assessment or patient education?: No  Are there any concerns regarding the patient's understanding of the importance of medication adherence?:  No    Adherence Questions  Linked Medication(s) Assessed: Dulaglutide (Trulicity), Insulin Glargine (LANTUS SOLOSTAR)  On average, how many doses/injections does the patient miss per month?: 0  What are the identified reasons for non-adherence or missed doses? : no problems identified  What is the estimated medication adherence level?: % (87%, only 2 fills completed.)  Based on the patient/caregiver response and refill history, does this patient require an MTP to track adherence improvements?: no    Additional Barriers to Patient Self-Administration: None.  Methods for Supporting Patient Self-Administration: Close follow up with pharmacist    Open Medication Therapy Problems  Type 2 diabetes mellitus with hyperglycemia, without long-term current use of insulin   1 Current Medication: Insulin Glargine (LANTUS SOLOSTAR) 100 UNIT/ML injection pen   Current Medication Sig: Inject 10 Units under the skin into the appropriate area as directed Daily. Increase as directed. MDD 30 units   Rationale: Medication requires monitoring - Needs additional monitoring   Identified Date: 1/22/2025   Note: 1/22: New start basal insulin. Planning to follow up closely initially to ensure patient understanding. MS.             Adverse Drug Reactions  Medication tolerability: Tolerating with no to minimal ADRs  Medication plan: Continue therapy with normal follow-up  Plan for ADR Management: None.    Goals of Therapy  Goals related to the patient's specialty therapy were discussed with the patient. The Patient Goals segment of this outreach has been reviewed and updated.   Goals Addressed Today        Specialty Pharmacy General Goal      A1C < 7 %     Lab Results    Component Value Date     HGBA1c 11.7 1/22/25 A1c slightly improved. Tolerating Trulicity 1.5 well, starting 3 mg today. Adding basal insulin with an initial goal for FBG to be below 180 mg/dL. Then will continue titration once patient is able to tolerate lower-normal BG.  MS.     Virtual  11/12/2024 Patient was seen today via virtual visit.  Patient tolerating medication well.  No issues.  Rakesh on track today.  Will follow up in one month to determine adherence on longer term.  RS    HGbA1c  12.3 10/21/24 Patient took Ozempic, but then did not get refilled. Patient would like to switch back to Trulicity. MS    HGBA1C 11.9 (A) 07/24/2024 New enrollment. Starting Ozempic.                  Quality of Life Assessment   Quality of Life related to the patient's enrollment in the patient management program and services provided was discussed with the patient. The QOL segment of this outreach has been reviewed and updated.    Quality of Life Improvement Scale: 9-A good deal better    Reassessment Plan & Follow-Up  1. Medication Therapy Changes: Add basal insulin, increase Trulicity   2. Related Plans, Therapy Recommendations, or Therapy Problems to Be Addressed: None.   3. Pharmacist to perform regular assessments no more than (6) months from the previous assessment.  4. Care Coordinator to set up future refill outreaches, coordinate prescription delivery, and escalate clinical questions to pharmacist.    Attestation  Therapeutic appropriateness: Appropriate   I attest the patient was actively involved in and has agreed to the above plan of care. If the prescribed therapy is at any point deemed not appropriate based on the current or future assessments, a consultation will be initiated with the patient's specialty care provider to determine the best course of action. The revised plan of therapy will be documented along with any required assessments and/or additional patient education provided.     Chelsey Lr, Kristina, BCCCP, BCPS  Clinical Specialty Pharmacist, Endocrinology  1/22/2025  09:50 EST    Discussed the aforementioned information with the patient via In-Person.

## 2025-01-22 NOTE — PATIENT INSTRUCTIONS
01/22/25    Chepe Meneses 1960     Your A1C is:   Lab Results   Component Value Date    HGBA1C 11.7 (A) 01/22/2025    HGBA1C 12.3 (H) 08/06/2024    HGBA1C 11.9 (A) 07/24/2024       ADA General Goals: A1c: < 7%                                                  Fasting/before meal glucose: <150 mg/dL                                    2 Hour after meal glucoses: < 180 mg/dL                                        Bedtime glucose:120-180                Lantus is long acting insulin that lasts 24 hours. It is taken at bedtime every night and should not be skipped unless instructed by your doctor       Starting Basal Insulin Dose: 10  units before bedtime           Change insulin doses every 3 days based on the following:    If before breakfast blood sugar readings are consistently higher than 180 for 3-4 days, raise insulin dose by 2 units.    If you are having frequent blood sugars lower than 70, call the office.    JUNE Moran

## 2025-01-22 NOTE — ASSESSMENT & PLAN NOTE
Patient has been off levothyroxine for about 2 weeks.  Will have him restart his previous dose of 125 mcg daily.  Gave patient lab orders to have thyroid levels rechecked in about 8 weeks.  Further recommendations regarding dose adjustments and lab rechecks based on results.

## 2025-01-24 ENCOUNTER — TELEPHONE (OUTPATIENT)
Dept: GENERAL RADIOLOGY | Facility: HOSPITAL | Age: 65
End: 2025-01-24
Payer: MEDICAID

## 2025-01-24 NOTE — TELEPHONE ENCOUNTER
Specialty Pharmacy Patient Management Program  Per Protocol Prescription Order/Refill     Patient currently fills medications at [facility] and is enrolled in an Endocrinology Patient Management Program.     Requested Prescriptions     Pending Prescriptions Disp Refills    Insulin Pen Needle 31G X 5 MM misc 100 each 1     Sig: Use with lantus once daily     Prescription orders above were sent to the pharmacy per Collaborative Care Agreement Protocol.     Patient didn't get pen needles with his Lantus solostar.  Sent via [facility]      Stephen Camejo Pharm.[email or web address].  Clinical Specialty Pharmacist, Endocrinology  08:15 EST 01/24/25

## 2025-02-10 ENCOUNTER — SPECIALTY PHARMACY (OUTPATIENT)
Dept: GENERAL RADIOLOGY | Facility: HOSPITAL | Age: 65
End: 2025-02-10
Payer: MEDICAID

## 2025-02-10 NOTE — PROGRESS NOTES
Specialty Pharmacy Patient Management Program  One-Time Clinical Outreach     Chepe Meneses is a 64 y.o. male seen by an Endocrinology provider for Type 2 Diabetes and enrolled in the Endocrinology Patient Management program offered by [facility].      Open Medication Therapy Problems  Type 2 diabetes mellitus with hyperglycemia, without long-term current use of insulin   1 Current Medication: Insulin Glargine (LANTUS SOLOSTAR) 100 UNIT/ML injection pen   Current Medication Sig: Inject 10 Units under the skin into the appropriate area as directed Daily. Increase as directed. MDD 30 units   Rationale: Medication requires monitoring - Needs additional monitoring   Identified Date: 1/22/2025   Note: 1/22: New start basal insulin. Planning to follow up closely initially to ensure patient understanding. MS.  1/27:  Patient doing well taking as prescribed.  Patient's AM glucose reading was 187.  Will follow up in 2 weeks, if patient still doing well, will close MTP. RS  02/10/2025:  Patient continues to do well with basal insulin.  Patient is taking with no issues and tolerating well.  Will close MTP and continue with normal follow ups.  RS             Silviano Camejo, PharmD  Clinical Specialty Pharmacist, Endocrinology  2/10/2025  11:46 EST

## 2025-02-21 ENCOUNTER — SPECIALTY PHARMACY (OUTPATIENT)
Dept: ENDOCRINOLOGY | Facility: CLINIC | Age: 65
End: 2025-02-21
Payer: MEDICAID

## 2025-02-21 NOTE — PROGRESS NOTES
Specialty Pharmacy Patient Management Program  Endocrinology Refill Outreach      Chepe is a 64 y.o. male contacted today regarding refills of his medication(s).    Specialty medication(s) and dose(s) confirmed:Trulicity    Refill Questions      Flowsheet Row Most Recent Value   Changes to allergies? No   Changes to medications? No   New conditions or infections since last clinic visit No   Unplanned office visit, urgent care, ED, or hospital admission in the last 4 weeks  No   How does patient/caregiver feel medication is working? Very good   Financial problems or insurance changes  No   Since the previous refill, were any specialty medication doses or scheduled injections missed or delayed?  No   Does this patient require a clinical escalation to a pharmacist? No          Delivery Questions      Flowsheet Row Most Recent Value   Delivery method UPS   Delivery address verified with patient/caregiver? Yes   Delivery address Home   Number of medications in delivery 2   Medication(s) being filled and delivered Continuous Glucose Sensor (Dexcom G7 Sensor), Dulaglutide (Trulicity)   Copay verified? Yes   Copay amount 0   Copay form of payment No copayment ($0)   Delivery Date Selection 02/25/25   Signature Required Yes            Follow-Up: 30d    Chelsey Lr, Pharm.D. BCPS, BCCCP  Clinical Specialty Pharmacist, Endocrinology   15:05 EST  02/21/25

## 2025-04-16 ENCOUNTER — SPECIALTY PHARMACY (OUTPATIENT)
Dept: ENDOCRINOLOGY | Facility: CLINIC | Age: 65
End: 2025-04-16
Payer: MEDICAID

## 2025-04-16 NOTE — PROGRESS NOTES
Specialty Pharmacy Patient Management Program  Refill Outreach     Chepe was contacted today regarding refills of their medication(s). Lantus SoloStar.     Refill Questions      Flowsheet Row Most Recent Value   Changes to allergies? No   Changes to medications? No   New conditions or infections since last clinic visit No   Unplanned office visit, urgent care, ED, or hospital admission in the last 4 weeks  No   How does patient/caregiver feel medication is working? Good   Financial problems or insurance changes  No   Since the previous refill, were any specialty medication doses or scheduled injections missed or delayed?  No   Does this patient require a clinical escalation to a pharmacist? No            Delivery Questions      Flowsheet Row Most Recent Value   Delivery method UPS   Delivery address verified with patient/caregiver? Yes   Delivery address Home   Number of medications in delivery 1   Medication(s) being filled and delivered Insulin Glargine (LANTUS SOLOSTAR)   Doses left of specialty medications 1   Copay verified? Yes   Copay amount $0.00   Copay form of payment No copayment ($0)   Signature Required Yes   Do you consent to receive electronic handouts?  Yes                 Follow-up: 92 day(s)     Kelsea Lisa, Pharmacy Technician  4/16/2025  10:02 EDT

## 2025-05-09 ENCOUNTER — SPECIALTY PHARMACY (OUTPATIENT)
Dept: ENDOCRINOLOGY | Facility: CLINIC | Age: 65
End: 2025-05-09
Payer: MEDICAID

## 2025-05-09 NOTE — PROGRESS NOTES
Specialty Pharmacy Patient Management Program  Refill Outreach     Chepe was contacted today regarding refills of their medication(s). Trulicity.    Refill Questions      Flowsheet Row Most Recent Value   Changes to allergies? No   Changes to medications? No   New conditions or infections since last clinic visit No   Unplanned office visit, urgent care, ED, or hospital admission in the last 4 weeks  No   How does patient/caregiver feel medication is working? Very good   Financial problems or insurance changes  No   Since the previous refill, were any specialty medication doses or scheduled injections missed or delayed?  No   Does this patient require a clinical escalation to a pharmacist? No            Delivery Questions      Flowsheet Row Most Recent Value   Delivery method UPS   Delivery address verified with patient/caregiver? Yes   Delivery address Home   Number of medications in delivery 3   Medication(s) being filled and delivered Continuous Glucose Sensor (Dexcom G7 Sensor), Dulaglutide (Trulicity), Insulin Pen Needle   Doses left of specialty medications 1   Copay verified? Yes   Copay amount $0.00   Copay form of payment No copayment ($0)   Delivery Date Selection 05/13/25   Signature Required Yes   Do you consent to receive electronic handouts?  Yes                 Follow-up: 28 day(s)     Kelsea Lisa, Pharmacy Technician  5/9/2025  09:17 EDT

## 2025-06-05 ENCOUNTER — SPECIALTY PHARMACY (OUTPATIENT)
Dept: ENDOCRINOLOGY | Facility: CLINIC | Age: 65
End: 2025-06-05
Payer: MEDICAID

## 2025-06-05 NOTE — PROGRESS NOTES
Specialty Pharmacy Patient Management Program  Endocrinology Refill Outreach      Chepe is a 65 y.o. male contacted today regarding refills of his medication(s).    Specialty medication(s) and dose(s) confirmed: Trulicity    Refill Questions      Flowsheet Row Most Recent Value   Changes to allergies? No   Changes to medications? No   New conditions or infections since last clinic visit No   Unplanned office visit, urgent care, ED, or hospital admission in the last 4 weeks  No   How does patient/caregiver feel medication is working? Good   Financial problems or insurance changes  No   Since the previous refill, were any specialty medication doses or scheduled injections missed or delayed?  No   Does this patient require a clinical escalation to a pharmacist? No          Delivery Questions      Flowsheet Row Most Recent Value   Delivery method UPS   Delivery address verified with patient/caregiver? Yes   Delivery address Home   Number of medications in delivery 2   Medication(s) being filled and delivered Continuous Glucose Sensor (Dexcom G7 Sensor), Dulaglutide (Trulicity)   Doses left of specialty medications 1   Copay verified? Yes   Copay amount $0.00   Copay form of payment No copayment ($0)   Delivery Date Selection 06/06/25   Signature Required Yes   Do you consent to receive electronic handouts?  Yes            Follow-Up: 28 days    Kelsea Lisa CPhT  Pharmacy Care Coordinator, Endocrinology  6/5/2025  10:54 EDT

## 2025-07-10 ENCOUNTER — SPECIALTY PHARMACY (OUTPATIENT)
Age: 65
End: 2025-07-10
Payer: MEDICAID

## 2025-07-10 NOTE — PROGRESS NOTES
Specialty Pharmacy Patient Management Program  Endocrinology Refill Outreach      Chepe is a 65 y.o. male contacted today regarding refills of his medication(s).    Specialty medication(s) and dose(s) confirmed: Lantus, Trulicity.    Refill Questions      Flowsheet Row Most Recent Value   Changes to allergies? No   Changes to medications? No   New conditions or infections since last clinic visit No   Unplanned office visit, urgent care, ED, or hospital admission in the last 4 weeks  No   How does patient/caregiver feel medication is working? Very good   Financial problems or insurance changes  No   Since the previous refill, were any specialty medication doses or scheduled injections missed or delayed?  No   Does this patient require a clinical escalation to a pharmacist? No          Delivery Questions      Flowsheet Row Most Recent Value   Delivery method UPS   Delivery address verified with patient/caregiver? Yes   Delivery address Home   Number of medications in delivery 3   Medication(s) being filled and delivered Continuous Glucose Sensor (Dexcom G7 Sensor), Dulaglutide (Trulicity), Insulin Glargine (LANTUS SOLOSTAR)   Doses left of specialty medications 1   Copay verified? Yes   Copay amount $0.00   Copay form of payment No copayment ($0)   Delivery Date Selection 07/15/25   Signature Required Yes   Do you consent to receive electronic handouts?  No            Follow-Up: 28d    Kash Alarocn CPhT  Pharmacy Care Coordinator, Endocrinology  7/10/2025  13:10 EDT

## 2025-07-14 ENCOUNTER — SPECIALTY PHARMACY (OUTPATIENT)
Dept: GENERAL RADIOLOGY | Facility: HOSPITAL | Age: 65
End: 2025-07-14
Payer: MEDICAID

## 2025-07-14 NOTE — PROGRESS NOTES
Specialty Pharmacy Patient Management Program  Endocrinology Reassessment     Chepe Meneses was referred by an Endocrinology provider to the Endocrinology Patient Management program offered by [facility] for Type 2 Diabetes. A follow-up outreach was conducted, including assessment of continued therapy appropriateness, medication adherence, and side effect incidence and management for Insulin Glargine and Trulicity.    Changes to Insurance Coverage or Financial Support  No changes to insurance.    Relevant Past Medical History and Comorbidities  Relevant medical history and concomitant health conditions were discussed with the patient. The patient's chart has been reviewed for relevant past medical history and comorbid health conditions and updated as necessary.   Past Medical History:   Diagnosis Date    Allergic     Depression     Diabetes mellitus     Hyperlipidemia     Hypertension     Hypothyroidism     Lung cancer     Malignant neoplasm of upper lobe of right lung 02/14/2023    Thyroid disorder     Type 2 diabetes mellitus      Social History     Socioeconomic History    Marital status:    Tobacco Use    Smoking status: Former     Current packs/day: 2.00     Average packs/day: 2.0 packs/day for 3.5 years (7.1 ttl pk-yrs)     Types: Cigarettes     Start date: 01/2022     Quit date: 01/1978    Smokeless tobacco: Never   Vaping Use    Vaping status: Never Used   Substance and Sexual Activity    Alcohol use: Never    Drug use: Never    Sexual activity: Defer     Problem list reviewed by Stephen Camejo RPH on 7/14/2025 at  1:04 PM    Hospitalizations and Urgent Care Since Last Assessment  ED Visits, Admissions, or Hospitalizations: None  Urgent Office Visits: None    Allergies  Known allergies and reactions were discussed with the patient. The patient's chart has been reviewed for allergy information and updated as necessary.   No Known Allergies  Allergies reviewed by Nell  ELEK Mitchell on 7/14/2025 at  1:04 PM    Relevant Laboratory Values  Relevant laboratory values were discussed with the patient. The following specialty medication dose adjustment(s) are recommended: None  A1C Last 3 Results          7/24/2024    08:21 8/6/2024    15:54 1/22/2025    08:21   HGBA1C Last 3 Results   Hemoglobin A1C 11.9  12.3     11.7       Details          This result is from an external source.             Lab Results   Component Value Date    HGBA1C 11.7 (A) 01/22/2025     Lab Results   Component Value Date    GLUCOSE 309 (H) 03/27/2024    CALCIUM 9.1 03/27/2024     03/27/2024    K 4.5 03/27/2024    CO2 29.3 (H) 03/27/2024    CL 98 03/27/2024    BUN 14 03/27/2024    CREATININE 1.10 03/27/2024    EGFRIFAFRI 104 10/08/2024    EGFRIFNONA >60 05/30/2022    BCR 12.7 03/27/2024    ANIONGAP 10 05/30/2022     Lab Results   Component Value Date    CHLPL 310 (H) 03/27/2024    TRIG 146 03/27/2024    HDL 59 03/27/2024     (H) 03/27/2024       Current Medication List  This medication list has been reviewed with the patient and evaluated for any interactions or necessary modifications/recommendations, and updated to include all prescription medications, OTC medications, and supplements the patient is currently taking.  This list reflects what is contained in the patient's profile, which has also been marked as reviewed to communicate to other providers it is the most up to date version of the patient's current medication therapy.     Current Outpatient Medications:     aspirin 81 MG EC tablet, Take 1 tablet by mouth Daily., Disp: , Rfl:     atorvastatin (LIPITOR) 20 MG tablet, TAKE 1 TABLET BY MOUTH AT BEDTIME, Disp: 30 tablet, Rfl: 1    buPROPion SR (WELLBUTRIN SR) 150 MG 12 hr tablet, TAKE 1 TABLET BY MOUTH DAILY WITH BREAKFAST, Disp: 30 tablet, Rfl: 1    Continuous Glucose  (Dexcom G7 ) device, Use 1 Device Continuous., Disp: 1 each, Rfl: 0    Continuous Glucose Sensor (Dexcom G7  Sensor) misc, Use 1 Device Every 10 (Ten) Days., Disp: 3 each, Rfl: 5    diclofenac (VOLTAREN) 50 MG EC tablet, TAKE 1 TABLET BY MOUTH DAILY AT LUNCH, Disp: 30 tablet, Rfl: 1    Dulaglutide (Trulicity) 3 MG/0.5ML solution auto-injector, Inject 3 mg under the skin into the appropriate area as directed 1 (One) Time Per Week., Disp: 2 mL, Rfl: 5    DULoxetine (CYMBALTA) 60 MG capsule, TAKE 1 CAPSULE BY MOUTH WITH DINNER, Disp: 30 capsule, Rfl: 1    gabapentin (NEURONTIN) 300 MG capsule, Take 1 capsule by mouth 2 (Two) Times a Day for 30 days., Disp: 60 capsule, Rfl: 0    Insulin Glargine (LANTUS SOLOSTAR) 100 UNIT/ML injection pen, Inject 10 Units under the skin into the appropriate area as directed Daily. Increase as directed. MDD 30 units, Disp: 27 mL, Rfl: 1    Insulin Pen Needle 31G X 5 MM misc, Use with lantus once daily, Disp: 100 each, Rfl: 1    Jardiance 25 MG tablet tablet, TAKE 1 TABLET BY MOUTH DAILY WITH LUNCH, Disp: 30 tablet, Rfl: 1    levothyroxine (Synthroid) 125 MCG tablet, Take 1 tablet by mouth Every Morning., Disp: 90 tablet, Rfl: 1    losartan (COZAAR) 25 MG tablet, TAKE 1 TABLET BY MOUTH DAILY WITH BREAKFAST, Disp: 30 tablet, Rfl: 1    metFORMIN (GLUCOPHAGE) 1000 MG tablet, TAKE 1 TABLET BY MOUTH TWO TIMES A DAY, Disp: 60 tablet, Rfl: 1    metoprolol succinate XL (TOPROL-XL) 25 MG 24 hr tablet, Take 1 tablet by mouth Daily. Patient takes 1/2 tab, Disp: , Rfl:     Medicines reviewed by Stephen Camejo RPH on 7/14/2025 at  1:04 PM    Drug Interactions  No Clinically Significant DDIs Were Identified at Present Time Upon Marking Medications Reviewed      Recommended Medications Assessment  Aspirin: Currently Taking   Statin: Currently Taking   ACEi/ARB: Currently Taking     Adverse Drug Reactions  Medication tolerability: Tolerating with no to minimal ADRs  Medication plan: Continue therapy with normal follow-up  Plan for ADR Management: No adverse drug reactions reported.     Adherence,  Self-Administration, and Current Therapy Problems  Adherence related to the patient's specialty therapy was discussed with the patient. The Adherence segment of this outreach has been reviewed and updated.     Adherence Questions  Linked Medication(s) Assessed: Dulaglutide (Trulicity), Insulin Glargine (LANTUS SOLOSTAR)  On average, how many doses/injections does the patient miss per month?: 0  What are the identified reasons for non-adherence or missed doses? : no problems identified  What is the estimated medication adherence level?: (S) % (Trulicity PDC 73%- pt confirms taking as prescribed.  He has gotten last 3 fills on time.)  Based on the patient/caregiver response and refill history, does this patient require an MTP to track adherence improvements?: no    Additional Barriers to Patient Self-Administration: None  Methods for Supporting Patient Self-Administration: n/a    Open Medication Therapy Problems  No medication therapy recommendations to display    Goals of Therapy  Goals related to the patient's specialty therapy were discussed with the patient. The Patient Goals segment of this outreach has been reviewed and updated.   Goals Addressed Today        Specialty Pharmacy General Goal      A1C < 7 %     Lab Results    Component Value Date     Phone assessment  07/14/2025 Patient tolerating medication well.  Will susan on track with recent fill history.  Will follow up at next appointment.  RS    HGBA1c 11.7 1/22/25 A1c slightly improved. Tolerating Trulicity 1.5 well, starting 3 mg today. Adding basal insulin with an initial goal for FBG to be below 180 mg/dL. Then will continue titration once patient is able to tolerate lower-normal BG. MS.     Virtual  11/12/2024 Patient was seen today via virtual visit.  Patient tolerating medication well.  No issues.  Susan on track today.  Will follow up in one month to determine adherence on longer term.  RS    HGbA1c  12.3 10/21/24 Patient took Ozempic, but then  did not get refilled. Patient would like to switch back to Trulicity. MS    HGBA1C 11.9 (A) 07/24/2024 New enrollment. Starting Ozempic.                    Quality of Life Assessment   Quality of Life related to the patient's enrollment in the patient management program and services provided was discussed with the patient. The QOL segment of this outreach has been reviewed and updated.  Quality of Life Improvement Scale: 9-A good deal better    Reassessment Plan & Follow-Up  1. Medication Therapy Changes: No changes  2. Related Plans, Therapy Recommendations, or Issues to Be Addressed: None  3. Pharmacist to perform regular assessments no more than (6) months from the previous assessment.  4. Care Coordinator to set up future refill outreaches, coordinate prescription delivery, and escalate clinical questions to pharmacist.    Attestation  Therapeutic appropriateness: Appropriate   I attest the patient was actively involved in and has agreed to the above plan of care.  If the prescribed therapy is at any point deemed not appropriate based on the current or future assessments, a consultation will be initiated with the patient's specialty care provider to determine the best course of action. The revised plan of therapy will be documented along with any required assessments and/or additional patient education provided.     Silviano Camejo, SkylaD     Clinical Specialty Pharmacist, Endocrinology  7/14/2025  13:15 EDT

## 2025-08-05 ENCOUNTER — SPECIALTY PHARMACY (OUTPATIENT)
Dept: ENDOCRINOLOGY | Facility: CLINIC | Age: 65
End: 2025-08-05
Payer: MEDICAID

## 2025-08-19 ENCOUNTER — SPECIALTY PHARMACY (OUTPATIENT)
Dept: ENDOCRINOLOGY | Facility: CLINIC | Age: 65
End: 2025-08-19
Payer: MEDICAID

## 2025-08-29 ENCOUNTER — SPECIALTY PHARMACY (OUTPATIENT)
Dept: ENDOCRINOLOGY | Facility: CLINIC | Age: 65
End: 2025-08-29
Payer: MEDICAID

## 2025-08-29 ENCOUNTER — SPECIALTY PHARMACY (OUTPATIENT)
Dept: GENERAL RADIOLOGY | Facility: HOSPITAL | Age: 65
End: 2025-08-29
Payer: MEDICAID

## 2025-08-29 DIAGNOSIS — E11.65 TYPE 2 DIABETES MELLITUS WITH HYPERGLYCEMIA, WITHOUT LONG-TERM CURRENT USE OF INSULIN: ICD-10-CM

## 2025-08-29 RX ORDER — DULAGLUTIDE 3 MG/.5ML
3 INJECTION, SOLUTION SUBCUTANEOUS WEEKLY
Qty: 6 ML | Refills: 0 | Status: SHIPPED | OUTPATIENT
Start: 2025-08-29